# Patient Record
Sex: FEMALE | Race: BLACK OR AFRICAN AMERICAN | NOT HISPANIC OR LATINO | Employment: FULL TIME | RURAL
[De-identification: names, ages, dates, MRNs, and addresses within clinical notes are randomized per-mention and may not be internally consistent; named-entity substitution may affect disease eponyms.]

---

## 2020-08-06 ENCOUNTER — HISTORICAL (OUTPATIENT)
Dept: ADMINISTRATIVE | Facility: HOSPITAL | Age: 40
End: 2020-08-06

## 2020-08-07 LAB — SARS-COV+SARS-COV-2 AG RESP QL IA.RAPID: NEGATIVE

## 2020-08-14 ENCOUNTER — HISTORICAL (OUTPATIENT)
Dept: ADMINISTRATIVE | Facility: HOSPITAL | Age: 40
End: 2020-08-14

## 2020-08-15 LAB — SARS-COV+SARS-COV-2 AG RESP QL IA.RAPID: NEGATIVE

## 2020-08-19 ENCOUNTER — HISTORICAL (OUTPATIENT)
Dept: ADMINISTRATIVE | Facility: HOSPITAL | Age: 40
End: 2020-08-19

## 2020-08-20 LAB — SARS-COV+SARS-COV-2 AG RESP QL IA.RAPID: NEGATIVE

## 2020-08-25 ENCOUNTER — HISTORICAL (OUTPATIENT)
Dept: ADMINISTRATIVE | Facility: HOSPITAL | Age: 40
End: 2020-08-25

## 2020-08-26 LAB — SARS-COV+SARS-COV-2 AG RESP QL IA.RAPID: NEGATIVE

## 2020-09-02 ENCOUNTER — HISTORICAL (OUTPATIENT)
Dept: ADMINISTRATIVE | Facility: HOSPITAL | Age: 40
End: 2020-09-02

## 2020-09-03 LAB — SARS-COV+SARS-COV-2 AG RESP QL IA.RAPID: NEGATIVE

## 2020-09-08 ENCOUNTER — HISTORICAL (OUTPATIENT)
Dept: ADMINISTRATIVE | Facility: HOSPITAL | Age: 40
End: 2020-09-08

## 2020-09-09 LAB — SARS-COV+SARS-COV-2 AG RESP QL IA.RAPID: NEGATIVE

## 2020-09-16 ENCOUNTER — HISTORICAL (OUTPATIENT)
Dept: ADMINISTRATIVE | Facility: HOSPITAL | Age: 40
End: 2020-09-16

## 2020-09-17 LAB — SARS-COV+SARS-COV-2 AG RESP QL IA.RAPID: NEGATIVE

## 2020-09-22 ENCOUNTER — HISTORICAL (OUTPATIENT)
Dept: ADMINISTRATIVE | Facility: HOSPITAL | Age: 40
End: 2020-09-22

## 2020-09-23 LAB — SARS-COV+SARS-COV-2 AG RESP QL IA.RAPID: NEGATIVE

## 2020-09-30 ENCOUNTER — HISTORICAL (OUTPATIENT)
Dept: ADMINISTRATIVE | Facility: HOSPITAL | Age: 40
End: 2020-09-30

## 2020-09-30 LAB — SARS-COV+SARS-COV-2 AG RESP QL IA.RAPID: NEGATIVE

## 2020-10-01 ENCOUNTER — HISTORICAL (OUTPATIENT)
Dept: ADMINISTRATIVE | Facility: HOSPITAL | Age: 40
End: 2020-10-01

## 2020-10-05 LAB
CHOLEST SERPL-MCNC: 158 MG/DL
CHOLEST/HDLC SERPL: 2.5 {RATIO}
HDLC SERPL-MCNC: 63 MG/DL
LDLC SERPL CALC-MCNC: 86 MG/DL
TRIGL SERPL-MCNC: 44 MG/DL

## 2020-10-07 LAB
LAB AP CLINICAL INFORMATION: NORMAL
LAB AP GENERAL CAT - HISTORICAL: NORMAL
LAB AP INTERPRETATION/RESULT - HISTORICAL: NEGATIVE
LAB AP SPECIMEN ADEQUACY - HISTORICAL: NORMAL
LAB AP SPECIMEN SUBMITTED - HISTORICAL: NORMAL

## 2020-10-15 LAB
HPV 16/18: NORMAL
HPV OTHER: POSITIVE
THIN PREP: NORMAL

## 2020-10-20 ENCOUNTER — HISTORICAL (OUTPATIENT)
Dept: ADMINISTRATIVE | Facility: HOSPITAL | Age: 40
End: 2020-10-20

## 2020-10-20 LAB
BASOPHILS # BLD AUTO: 0.05 X10E3/UL (ref 0–0.2)
BASOPHILS NFR BLD AUTO: 1.2 % (ref 0–1)
EOSINOPHIL # BLD AUTO: 0.05 X10E3/UL (ref 0–0.5)
EOSINOPHIL NFR BLD AUTO: 1.2 % (ref 1–4)
ERYTHROCYTE [DISTWIDTH] IN BLOOD BY AUTOMATED COUNT: 13.2 % (ref 11.5–14.5)
HCT VFR BLD AUTO: 39.7 % (ref 38–47)
HGB BLD-MCNC: 12.9 G/DL (ref 12–16)
IMM GRANULOCYTES # BLD AUTO: 0.01 X10E3/UL (ref 0–0.04)
IMM GRANULOCYTES NFR BLD: 0.2 % (ref 0–0.4)
LYMPHOCYTES # BLD AUTO: 1.8 X10E3/UL (ref 1–4.8)
LYMPHOCYTES NFR BLD AUTO: 44.4 % (ref 27–41)
MCH RBC QN AUTO: 31.3 PG (ref 27–31)
MCHC RBC AUTO-ENTMCNC: 32.5 G/DL (ref 32–36)
MCV RBC AUTO: 96.4 FL (ref 80–96)
MONOCYTES # BLD AUTO: 0.6 X10E3/UL (ref 0–0.8)
MONOCYTES NFR BLD AUTO: 14.8 % (ref 2–6)
MPC BLD CALC-MCNC: 9.9 FL (ref 9.4–12.4)
NEUTROPHILS # BLD AUTO: 1.54 X10E3/UL (ref 1.8–7.7)
NEUTROPHILS NFR BLD AUTO: 38.2 % (ref 53–65)
NRBC # BLD AUTO: 0 X10E3/UL (ref 0–0)
NRBC, AUTO (.00): 0 /100 (ref 0–0)
PLATELET # BLD AUTO: 278 X10E3/UL (ref 150–400)
RBC # BLD AUTO: 4.12 X10E6/UL (ref 4.2–5.4)
WBC # BLD AUTO: 4.05 X10E3/UL (ref 4.5–11)

## 2020-10-23 ENCOUNTER — HISTORICAL (OUTPATIENT)
Dept: ADMINISTRATIVE | Facility: HOSPITAL | Age: 40
End: 2020-10-23

## 2020-10-23 LAB — SARS-COV+SARS-COV-2 AG RESP QL IA.RAPID: NEGATIVE

## 2020-10-28 ENCOUNTER — HISTORICAL (OUTPATIENT)
Dept: ADMINISTRATIVE | Facility: HOSPITAL | Age: 40
End: 2020-10-28

## 2020-10-29 LAB — SARS-COV+SARS-COV-2 AG RESP QL IA.RAPID: NEGATIVE

## 2020-11-03 ENCOUNTER — HISTORICAL (OUTPATIENT)
Dept: ADMINISTRATIVE | Facility: HOSPITAL | Age: 40
End: 2020-11-03

## 2020-11-04 LAB — SARS-COV+SARS-COV-2 AG RESP QL IA.RAPID: NEGATIVE

## 2020-11-11 ENCOUNTER — HISTORICAL (OUTPATIENT)
Dept: ADMINISTRATIVE | Facility: HOSPITAL | Age: 40
End: 2020-11-11

## 2020-11-11 LAB — SARS-COV+SARS-COV-2 AG RESP QL IA.RAPID: NEGATIVE

## 2020-11-17 ENCOUNTER — HISTORICAL (OUTPATIENT)
Dept: ADMINISTRATIVE | Facility: HOSPITAL | Age: 40
End: 2020-11-17

## 2020-11-18 LAB — SARS-COV+SARS-COV-2 AG RESP QL IA.RAPID: NEGATIVE

## 2020-11-20 ENCOUNTER — HISTORICAL (OUTPATIENT)
Dept: ADMINISTRATIVE | Facility: HOSPITAL | Age: 40
End: 2020-11-20

## 2020-11-20 LAB — IMPRESS AND RECOMMEND: NORMAL

## 2020-11-24 LAB — HCG UR QL IA.RAPID: NEGATIVE

## 2020-11-25 ENCOUNTER — HISTORICAL (OUTPATIENT)
Dept: ADMINISTRATIVE | Facility: HOSPITAL | Age: 40
End: 2020-11-25

## 2020-11-25 LAB — SARS-COV+SARS-COV-2 AG RESP QL IA.RAPID: NEGATIVE

## 2020-12-01 ENCOUNTER — HISTORICAL (OUTPATIENT)
Dept: ADMINISTRATIVE | Facility: HOSPITAL | Age: 40
End: 2020-12-01

## 2020-12-02 LAB — SARS-COV+SARS-COV-2 AG RESP QL IA.RAPID: NEGATIVE

## 2020-12-04 ENCOUNTER — HISTORICAL (OUTPATIENT)
Dept: ADMINISTRATIVE | Facility: HOSPITAL | Age: 40
End: 2020-12-04

## 2020-12-05 LAB — SARS-COV+SARS-COV-2 AG RESP QL IA.RAPID: NEGATIVE

## 2020-12-06 ENCOUNTER — HISTORICAL (OUTPATIENT)
Dept: ADMINISTRATIVE | Facility: HOSPITAL | Age: 40
End: 2020-12-06

## 2020-12-07 LAB — SARS-COV+SARS-COV-2 AG RESP QL IA.RAPID: NEGATIVE

## 2020-12-10 ENCOUNTER — HISTORICAL (OUTPATIENT)
Dept: ADMINISTRATIVE | Facility: HOSPITAL | Age: 40
End: 2020-12-10

## 2020-12-11 LAB — SARS-COV+SARS-COV-2 AG RESP QL IA.RAPID: NEGATIVE

## 2020-12-14 ENCOUNTER — HISTORICAL (OUTPATIENT)
Dept: ADMINISTRATIVE | Facility: HOSPITAL | Age: 40
End: 2020-12-14

## 2020-12-15 LAB — SARS-COV+SARS-COV-2 AG RESP QL IA.RAPID: NEGATIVE

## 2020-12-18 ENCOUNTER — HISTORICAL (OUTPATIENT)
Dept: ADMINISTRATIVE | Facility: HOSPITAL | Age: 40
End: 2020-12-18

## 2020-12-19 LAB — SARS-COV+SARS-COV-2 AG RESP QL IA.RAPID: NEGATIVE

## 2020-12-23 ENCOUNTER — HISTORICAL (OUTPATIENT)
Dept: ADMINISTRATIVE | Facility: HOSPITAL | Age: 40
End: 2020-12-23

## 2020-12-23 LAB — SARS-COV+SARS-COV-2 AG RESP QL IA.RAPID: NEGATIVE

## 2020-12-29 ENCOUNTER — HISTORICAL (OUTPATIENT)
Dept: ADMINISTRATIVE | Facility: HOSPITAL | Age: 40
End: 2020-12-29

## 2020-12-30 LAB — SARS-COV+SARS-COV-2 AG RESP QL IA.RAPID: NEGATIVE

## 2021-01-02 ENCOUNTER — HISTORICAL (OUTPATIENT)
Dept: ADMINISTRATIVE | Facility: HOSPITAL | Age: 41
End: 2021-01-02

## 2021-01-02 LAB — SARS-COV+SARS-COV-2 AG RESP QL IA.RAPID: NEGATIVE

## 2021-01-06 ENCOUNTER — HISTORICAL (OUTPATIENT)
Dept: ADMINISTRATIVE | Facility: HOSPITAL | Age: 41
End: 2021-01-06

## 2021-01-06 LAB — SARS-COV+SARS-COV-2 AG RESP QL IA.RAPID: NEGATIVE

## 2021-01-15 ENCOUNTER — HISTORICAL (OUTPATIENT)
Dept: ADMINISTRATIVE | Facility: HOSPITAL | Age: 41
End: 2021-01-15

## 2021-01-15 LAB — SARS-COV+SARS-COV-2 AG RESP QL IA.RAPID: NEGATIVE

## 2021-01-21 ENCOUNTER — HISTORICAL (OUTPATIENT)
Dept: ADMINISTRATIVE | Facility: HOSPITAL | Age: 41
End: 2021-01-21

## 2021-01-22 LAB — SARS-COV+SARS-COV-2 AG RESP QL IA.RAPID: NEGATIVE

## 2021-01-26 ENCOUNTER — HISTORICAL (OUTPATIENT)
Dept: ADMINISTRATIVE | Facility: HOSPITAL | Age: 41
End: 2021-01-26

## 2021-01-26 LAB — SARS-COV+SARS-COV-2 AG RESP QL IA.RAPID: NEGATIVE

## 2021-01-31 ENCOUNTER — HISTORICAL (OUTPATIENT)
Dept: ADMINISTRATIVE | Facility: HOSPITAL | Age: 41
End: 2021-01-31

## 2021-01-31 LAB — SARS-COV+SARS-COV-2 AG RESP QL IA.RAPID: NEGATIVE

## 2021-02-12 ENCOUNTER — HISTORICAL (OUTPATIENT)
Dept: ADMINISTRATIVE | Facility: HOSPITAL | Age: 41
End: 2021-02-12

## 2021-02-13 LAB — SARS-COV+SARS-COV-2 AG RESP QL IA.RAPID: NEGATIVE

## 2021-02-22 ENCOUNTER — HISTORICAL (OUTPATIENT)
Dept: ADMINISTRATIVE | Facility: HOSPITAL | Age: 41
End: 2021-02-22

## 2021-02-23 LAB — SARS-COV+SARS-COV-2 AG RESP QL IA.RAPID: NEGATIVE

## 2021-03-04 ENCOUNTER — HISTORICAL (OUTPATIENT)
Dept: ADMINISTRATIVE | Facility: HOSPITAL | Age: 41
End: 2021-03-04

## 2021-03-05 LAB — SARS-COV+SARS-COV-2 AG RESP QL IA.RAPID: NEGATIVE

## 2021-10-06 ENCOUNTER — OFFICE VISIT (OUTPATIENT)
Dept: OBSTETRICS AND GYNECOLOGY | Facility: CLINIC | Age: 41
End: 2021-10-06
Payer: COMMERCIAL

## 2021-10-06 VITALS
HEIGHT: 66 IN | BODY MASS INDEX: 39.82 KG/M2 | WEIGHT: 247.81 LBS | DIASTOLIC BLOOD PRESSURE: 74 MMHG | SYSTOLIC BLOOD PRESSURE: 120 MMHG

## 2021-10-06 DIAGNOSIS — R32 URINARY INCONTINENCE, UNSPECIFIED TYPE: ICD-10-CM

## 2021-10-06 DIAGNOSIS — Z13.29 THYROID DISORDER SCREENING: ICD-10-CM

## 2021-10-06 DIAGNOSIS — Z13.1 SCREENING FOR DIABETES MELLITUS: Primary | ICD-10-CM

## 2021-10-06 DIAGNOSIS — Z13.220 SCREENING FOR LIPOID DISORDERS: ICD-10-CM

## 2021-10-06 DIAGNOSIS — Z01.818 PRE-OP EXAMINATION: Primary | ICD-10-CM

## 2021-10-06 DIAGNOSIS — R87.810 CERVICAL HIGH RISK HUMAN PAPILLOMAVIRUS (HPV) DNA TEST POSITIVE: ICD-10-CM

## 2021-10-06 DIAGNOSIS — Z12.4 SCREENING FOR MALIGNANT NEOPLASM OF THE CERVIX: Primary | ICD-10-CM

## 2021-10-06 DIAGNOSIS — Z64.1 MULTIPARITY: Primary | ICD-10-CM

## 2021-10-06 DIAGNOSIS — Z30.2 ENCOUNTER FOR STERILIZATION: ICD-10-CM

## 2021-10-06 LAB
BACTERIA #/AREA URNS HPF: ABNORMAL /HPF
BILIRUB UR QL STRIP: NEGATIVE
CLARITY UR: CLEAR
COLOR UR: YELLOW
GLUCOSE UR STRIP-MCNC: NEGATIVE MG/DL
KETONES UR STRIP-SCNC: NEGATIVE MG/DL
LEUKOCYTE ESTERASE UR QL STRIP: NEGATIVE
MUCOUS THREADS #/AREA URNS HPF: ABNORMAL /HPF
NITRITE UR QL STRIP: NEGATIVE
PH UR STRIP: 6 PH UNITS
PROT UR QL STRIP: NEGATIVE
RBC # UR STRIP: ABNORMAL /UL
RBC #/AREA URNS HPF: ABNORMAL /HPF
SP GR UR STRIP: 1.02
SQUAMOUS #/AREA URNS LPF: ABNORMAL /LPF
UROBILINOGEN UR STRIP-ACNC: 0.2 MG/DL
WBC #/AREA URNS HPF: ABNORMAL /HPF

## 2021-10-06 PROCEDURE — 81001 URINALYSIS AUTO W/SCOPE: CPT | Mod: ,,, | Performed by: CLINICAL MEDICAL LABORATORY

## 2021-10-06 PROCEDURE — 1159F MED LIST DOCD IN RCRD: CPT | Mod: ,,, | Performed by: OBSTETRICS & GYNECOLOGY

## 2021-10-06 PROCEDURE — 3078F DIAST BP <80 MM HG: CPT | Mod: ,,, | Performed by: OBSTETRICS & GYNECOLOGY

## 2021-10-06 PROCEDURE — 3078F PR MOST RECENT DIASTOLIC BLOOD PRESSURE < 80 MM HG: ICD-10-PCS | Mod: ,,, | Performed by: OBSTETRICS & GYNECOLOGY

## 2021-10-06 PROCEDURE — 87624 HUMAN PAPILLOMAVIRUS (HPV): ICD-10-PCS | Mod: ,,, | Performed by: CLINICAL MEDICAL LABORATORY

## 2021-10-06 PROCEDURE — 87086 CULTURE, URINE: ICD-10-PCS | Mod: ,,, | Performed by: CLINICAL MEDICAL LABORATORY

## 2021-10-06 PROCEDURE — 99213 OFFICE O/P EST LOW 20 MIN: CPT | Mod: PBBFAC | Performed by: OBSTETRICS & GYNECOLOGY

## 2021-10-06 PROCEDURE — 99396 PREV VISIT EST AGE 40-64: CPT | Mod: S$PBB,,, | Performed by: OBSTETRICS & GYNECOLOGY

## 2021-10-06 PROCEDURE — 88142 CYTOPATH C/V THIN LAYER: CPT | Mod: GCY | Performed by: OBSTETRICS & GYNECOLOGY

## 2021-10-06 PROCEDURE — 87624 HPV HI-RISK TYP POOLED RSLT: CPT | Mod: ,,, | Performed by: CLINICAL MEDICAL LABORATORY

## 2021-10-06 PROCEDURE — 3074F PR MOST RECENT SYSTOLIC BLOOD PRESSURE < 130 MM HG: ICD-10-PCS | Mod: ,,, | Performed by: OBSTETRICS & GYNECOLOGY

## 2021-10-06 PROCEDURE — 81001 URINALYSIS: ICD-10-PCS | Mod: ,,, | Performed by: CLINICAL MEDICAL LABORATORY

## 2021-10-06 PROCEDURE — 3008F PR BODY MASS INDEX (BMI) DOCUMENTED: ICD-10-PCS | Mod: ,,, | Performed by: OBSTETRICS & GYNECOLOGY

## 2021-10-06 PROCEDURE — 1160F RVW MEDS BY RX/DR IN RCRD: CPT | Mod: ,,, | Performed by: OBSTETRICS & GYNECOLOGY

## 2021-10-06 PROCEDURE — 3008F BODY MASS INDEX DOCD: CPT | Mod: ,,, | Performed by: OBSTETRICS & GYNECOLOGY

## 2021-10-06 PROCEDURE — 99396 PR PREVENTIVE VISIT,EST,40-64: ICD-10-PCS | Mod: S$PBB,,, | Performed by: OBSTETRICS & GYNECOLOGY

## 2021-10-06 PROCEDURE — 1159F PR MEDICATION LIST DOCUMENTED IN MEDICAL RECORD: ICD-10-PCS | Mod: ,,, | Performed by: OBSTETRICS & GYNECOLOGY

## 2021-10-06 PROCEDURE — 1160F PR REVIEW ALL MEDS BY PRESCRIBER/CLIN PHARMACIST DOCUMENTED: ICD-10-PCS | Mod: ,,, | Performed by: OBSTETRICS & GYNECOLOGY

## 2021-10-06 PROCEDURE — 3074F SYST BP LT 130 MM HG: CPT | Mod: ,,, | Performed by: OBSTETRICS & GYNECOLOGY

## 2021-10-06 PROCEDURE — 87086 URINE CULTURE/COLONY COUNT: CPT | Mod: ,,, | Performed by: CLINICAL MEDICAL LABORATORY

## 2021-10-06 RX ORDER — ETONOGESTREL AND ETHINYL ESTRADIOL VAGINAL RING .015; .12 MG/D; MG/D
RING VAGINAL
COMMUNITY
Start: 2021-09-18

## 2021-10-07 LAB
GH SERPL-MCNC: NORMAL NG/ML
INSULIN SERPL-ACNC: NORMAL U[IU]/ML
LAB AP CLINICAL INFORMATION: NORMAL
LAB AP GYN INTERPRETATION: NEGATIVE
LAB AP PAP DISCLAIMER COMMENTS: NORMAL
RENIN PLAS-CCNC: NORMAL NG/ML/H

## 2021-10-08 LAB — UA COMPLETE W REFLEX CULTURE PNL UR: NO GROWTH

## 2021-10-13 LAB
HPV 16: NEGATIVE
HPV 18: NEGATIVE
HPV OTHER: POSITIVE

## 2021-10-14 DIAGNOSIS — R87.810 CERVICAL HIGH RISK HUMAN PAPILLOMAVIRUS (HPV) DNA TEST POSITIVE: Primary | ICD-10-CM

## 2021-11-09 DIAGNOSIS — Z30.2 ENCOUNTER FOR STERILIZATION: ICD-10-CM

## 2021-11-09 DIAGNOSIS — Z64.1 MULTIPARITY: Primary | ICD-10-CM

## 2022-05-09 ENCOUNTER — PROCEDURE VISIT (OUTPATIENT)
Dept: OBSTETRICS AND GYNECOLOGY | Facility: CLINIC | Age: 42
End: 2022-05-09
Payer: COMMERCIAL

## 2022-05-09 VITALS
SYSTOLIC BLOOD PRESSURE: 128 MMHG | WEIGHT: 237.19 LBS | DIASTOLIC BLOOD PRESSURE: 72 MMHG | BODY MASS INDEX: 38.29 KG/M2

## 2022-05-09 DIAGNOSIS — R87.810 CERVICAL HIGH RISK HUMAN PAPILLOMAVIRUS (HPV) DNA TEST POSITIVE: Primary | ICD-10-CM

## 2022-05-09 PROCEDURE — 88305 TISSUE EXAM BY PATHOLOGIST: CPT | Mod: 26,,, | Performed by: PATHOLOGY

## 2022-05-09 PROCEDURE — 57454 PR COLPOSC,CERVIX W/ADJ VAG,W/BX & CURRETAG: ICD-10-PCS | Mod: S$PBB,,, | Performed by: OBSTETRICS & GYNECOLOGY

## 2022-05-09 PROCEDURE — 88305 SURGICAL PATHOLOGY: ICD-10-PCS | Mod: 26,,, | Performed by: PATHOLOGY

## 2022-05-09 PROCEDURE — 88305 TISSUE EXAM BY PATHOLOGIST: CPT | Mod: SUR | Performed by: OBSTETRICS & GYNECOLOGY

## 2022-05-09 PROCEDURE — 88342 SURGICAL PATHOLOGY: ICD-10-PCS | Mod: 26,,, | Performed by: PATHOLOGY

## 2022-05-09 PROCEDURE — 88342 IMHCHEM/IMCYTCHM 1ST ANTB: CPT | Mod: 26,,, | Performed by: PATHOLOGY

## 2022-05-09 PROCEDURE — 57454 BX/CURETT OF CERVIX W/SCOPE: CPT | Mod: S$PBB,,, | Performed by: OBSTETRICS & GYNECOLOGY

## 2022-05-09 PROCEDURE — 57454 BX/CURETT OF CERVIX W/SCOPE: CPT | Mod: PBBFAC | Performed by: OBSTETRICS & GYNECOLOGY

## 2022-05-09 RX ORDER — MELOXICAM 15 MG/1
TABLET ORAL
Status: ON HOLD | COMMUNITY
Start: 2022-05-04 | End: 2022-08-22

## 2022-05-09 RX ORDER — METHOCARBAMOL 750 MG/1
750 TABLET, FILM COATED ORAL NIGHTLY PRN
COMMUNITY
Start: 2022-05-04

## 2022-05-09 NOTE — PATIENT INSTRUCTIONS
Follow-up appointment with me in approximately 4 weeks.    She will notify me if any problems arise

## 2022-05-09 NOTE — PROCEDURES
Presents for colposcopic examination.    Patient has had 2 previous Paps yearly which were negative however HPV other were persistently positive    HPV 16 and 18 were both negative.    She was instructed present for colposcopic examination there has been some delay secondary to workup.    Speculum exam done today cervix is grossly normal to appearance.  Cervix was painted with ascetic acid.    Thorough colposcopic exam was performed with both white and green light.  There was some mild to at most moderate aceto-white areas noted at 11:31 a.m..  However the squamocolumnar junction was seen 360°.  There was no aceto-white area within the endocervical canal.  Representative biopsies were taken at 11 and 12:00 p.m. another biopsy was taken at 6:00 a.m..  ECC performed.    Monsel's solution applied.  Good hemostasis noted.    Findings were discussed in the office.  We will follow-up pathology report.  She desires contraception has previously used NuvaRing    The options of a NuvaRing versus Mirena IUD discussed she does have moderate dysmenorrhea and menorrhagia.    Depending on pathology report we have also discussed the possibility of proceeding with NovaSure endometrial ablation and tubal ligation.    The option of hysterectomy discussed however we will discussed final management upon review of pathology.    Follow-up appointment in approximately 3-4 weeks.

## 2022-05-11 DIAGNOSIS — M25.561 RIGHT KNEE PAIN: Primary | ICD-10-CM

## 2022-05-11 LAB
DHEA SERPL-MCNC: NORMAL
ESTROGEN SERPL-MCNC: NORMAL PG/ML
INSULIN SERPL-ACNC: NORMAL U[IU]/ML
LAB AP CLINICAL INFORMATION: NORMAL
LAB AP GROSS DESCRIPTION: NORMAL
LAB AP LABORATORY NOTES: NORMAL
T3RU NFR SERPL: NORMAL %

## 2022-05-12 ENCOUNTER — TELEPHONE (OUTPATIENT)
Dept: OBSTETRICS AND GYNECOLOGY | Facility: CLINIC | Age: 42
End: 2022-05-12
Payer: COMMERCIAL

## 2022-05-12 NOTE — TELEPHONE ENCOUNTER
Discussed cervical biopsies and endocervical curettage revealed low-grade dysplasia.    We had previously discussed proceeding with hysteroscopic exam with D&C NovaSure ablation and tubal ligation.    However she also has moderate dysmenorrhea and now documented mild cervical dysplasia.    Discussed the possibility of proceeding with hysterectomy to address all of these problems.  We will discuss this further when she has a follow-up appointment with me June 6th 2022.    She voices understanding and will have follow-up as directed.

## 2022-05-17 ENCOUNTER — TELEPHONE (OUTPATIENT)
Dept: OBSTETRICS AND GYNECOLOGY | Facility: CLINIC | Age: 42
End: 2022-05-17
Payer: COMMERCIAL

## 2022-05-17 NOTE — TELEPHONE ENCOUNTER
Called patient, message left with instructions, (had spoken to patient on yesterday with issue she was having). To call office back if symptoms persist

## 2022-05-17 NOTE — TELEPHONE ENCOUNTER
----- Message from Savana Butt sent at 5/16/2022 10:40 AM CDT -----  Regarding: Phone call  Wants to talk with you.   Phone#: [phone].

## 2022-05-18 ENCOUNTER — CLINICAL SUPPORT (OUTPATIENT)
Dept: REHABILITATION | Facility: HOSPITAL | Age: 42
End: 2022-05-18
Payer: COMMERCIAL

## 2022-05-18 DIAGNOSIS — M25.561 RIGHT KNEE PAIN: ICD-10-CM

## 2022-05-18 DIAGNOSIS — M25.561 CHRONIC PAIN OF RIGHT KNEE: ICD-10-CM

## 2022-05-18 DIAGNOSIS — G89.29 CHRONIC PAIN OF RIGHT KNEE: ICD-10-CM

## 2022-05-18 PROCEDURE — 97161 PT EVAL LOW COMPLEX 20 MIN: CPT

## 2022-05-18 NOTE — PLAN OF CARE
[facility]  Physical Therapy Initial Evaluation    Name: Yolie Russell  Clinic Number: [id number]    Therapy Diagnosis:   Encounter Diagnosis   Name Primary?    Right knee pain      Physician: Dani Hung DO    Physician Orders: PT Eval and Treat   Medical Diagnosis from Referral: Right knee pain  Evaluation Date: 5/18/2022  Authorization Period Expiration:   Plan of Care Expiration: 6/24/22  Visit # / Visits authorized: 1/ 10    Time In: 1357  Time Out: 1420  Total Appointment Time (timed & untimed codes): 20 minutes    Precautions: Standard    Subjective   Date of onset: 3 1/2 years ago.  History of current condition - Yolie reports: a history of right knee pain for 3 1/2 years but has worsened in the last year with increased pain and swelling. Reports knee pops while walking and it locks up sometimes.      Medical History:   No past medical history on file.    Surgical History:   Yolie Russell  has a past surgical history that includes Dilation and curettage of uterus.    Medications:   Yolie has a current medication list which includes the following prescription(s): etonogestrel-ethinyl estradiol, meloxicam, and methocarbamol.    Allergies:   Review of patient's allergies indicates:  No Known Allergies     Imaging, MRI studies: but not available    Prior Therapy: yes  Social History:  lives with their family  Occupation: Nurse  Prior Level of Function: Independent  Current Level of Function: independent    Pain:  Current 3/10, worst 10/10, best 0/10   Location: right knee   Description: Aching, Dull and locks up  Aggravating Factors: Sitting, Standing, Bending and Walking  Easing Factors: pain medication and knee brace, weight loss    Pts goals: I want the pain and popping to stop.    Objective       Girth Measurements: Right 4 Left 45  Comments:      Range of motion:  Motion Right Left    Hip flexion  WITHIN FUNCTIONAL LIMITS  WITHIN FUNCTIONAL LIMITS   Hip extension  WITHIN FUNCTIONAL LIMITS   WITHIN FUNCTIONAL LIMITS   Hip abduction  WITHIN FUNCTIONAL LIMITS  WITHIN FUNCTIONAL LIMITS   Hip adduction  WITHIN FUNCTIONAL LIMITS  WITHIN FUNCTIONAL LIMITS   Internal rotation  WITHIN FUNCTIONAL LIMITS  WITHIN FUNCTIONAL LIMITS   External rotation  WITHIN FUNCTIONAL LIMITS  WITHIN FUNCTIONAL LIMITS   Knee extension  WITHIN FUNCTIONAL LIMITS  WITHIN FUNCTIONAL LIMITS   Knee flexion  125  WITHIN FUNCTIONAL LIMITS   Ankle DF  WITHIN FUNCTIONAL LIMITS  WITHIN FUNCTIONAL LIMITS   Ankle PF  WITHIN FUNCTIONAL LIMITS  WITHIN FUNCTIONAL LIMITS   Ankle Inversion  WITHIN FUNCTIONAL LIMITS  WITHIN FUNCTIONAL LIMITS   Ankle Eversion  WITHIN FUNCTIONAL LIMITS  WITHIN FUNCTIONAL LIMITS       Manual muscle test   Muscle Right  Left    Hip flexion  MMT strength: 5/5  MMT strength: 5/5   Hip extension  MMT strength: 5/5  MMT strength: 5/5   Hip abduction  MMT strength: 5/5  MMT strength: 5/5   Hip adduction  MMT strength: 5/5  MMT strength: 5/5   Hip internal rotation  MMT strength: 5/5  MMT strength: 5/5   Hip external rotation  MMT strength: 5/5  MMT strength: 5/5   Knee extension  MMT strength: 4/5  MMT strength: 5/5   Knee flexion  MMT strength: 4/5  MMT strength: 5/5   Ankle DF  MMT strength: 5/5  MMT strength: 5/5   Ankle PF  MMT strength: 5/5  MMT strength: 5/5   Ankle inversion  MMT strength: 5/5  MMT strength: 5/5   Ankle eversion  MMT strength: 5/5  MMT strength: 5/5       Gait:  Weight bearing precautions: FWB  Assistive device: none  Ambulation distance and deviations: community ambulator  Stairs: popping right knee ascending steps  Comments:      Clinical Special Tests:    Knee  1. Lochman's test: right Negative   2. Anterior drawer: right Negative   3. Posterior drawer: right Negative   4. Varus stress test: right Negative   5. Valgus stress test: right Positive   6. PFJ grind test: right Positive for patella femoral syndrome  7. McMurrays: right Negative      Assessment   Yolie is a 41 y.o. female referred to  outpatient Physical Therapy with a medical diagnosis of Right knee pain. Pt presents with pain, muscle weakness and knee joint instability.    Pt prognosis is Good.   Pt will benefit from skilled outpatient Physical Therapy to address the deficits stated above and in the chart below, provide pt/family education, and to maximize pt's level of independence.     Plan of care discussed with patient: Yes  Pt's spiritual, cultural and educational needs considered and patient is agreeable to the plan of care and goals as stated below:     Anticipated Barriers for therapy: none      Goals:  Short Term Goals: 2 weeks   1. Pt will be independent in performing right LE Home Exercise Program for strengthening.  2. Pt will increase right knee strength to 5/5 to allow patient to increase functional activity and mobility.  3. Pt will report decrease in pain to 0/10 at rest and 4/10 or less with activity to improve quality of life.    Long Term Goals: 4 weeks   1. Pt will tolerate 30 to 40 minutes exercise/activity with right knee pain no greater than 2/10.  2. Pt will demonstrate independent taping of right knee for at home management of patella femoral symptoms.        Plan   Plan of care Certification: 5/18/2022 to 6/24/22.    Outpatient Physical Therapy 2 times weekly for 5 weeks to include the following interventions: Moist Heat/ Ice, Patient Education, Therapeutic Exercise and Kinesio Tape.     Supervised visit: Case conference with Lavonne Apple, ERNESTINE and POC reviewed.    Geovani Alves, PT      Physician Signature:________________________________________________      Date:____________________________________________________________

## 2022-05-23 ENCOUNTER — CLINICAL SUPPORT (OUTPATIENT)
Dept: REHABILITATION | Facility: HOSPITAL | Age: 42
End: 2022-05-23
Payer: COMMERCIAL

## 2022-05-23 DIAGNOSIS — G89.29 CHRONIC PAIN OF RIGHT KNEE: Primary | ICD-10-CM

## 2022-05-23 DIAGNOSIS — M25.561 CHRONIC PAIN OF RIGHT KNEE: Primary | ICD-10-CM

## 2022-05-23 PROCEDURE — 97110 THERAPEUTIC EXERCISES: CPT

## 2022-05-23 NOTE — PROGRESS NOTES
Physical Therapy Daily Note     Name: Yolie Russell  [facility] Number: [id number]  Diagnosis:   Encounter Diagnosis   Name Primary?    Chronic pain of right knee Yes     Physician: Dani Hung DO  Precautions: standard  Visit #: 2 of 10  PTA Visit #:   Time In: 1400  Time Out: 1430    Subjective     Pt reports: right knee aches and pops.  Pain Scale: Yolie rates pain on a scale of 0-10 to be 3 currently.    Objective     Yolie received individual therapeutic exercises to develop strength and stability for 30 minutes including:  SciFit stepper 6 minutes 2 level  Quad sets 2 x 15  4 way right leg raises: SLR, hip ADD, hip ABD, hip extension 2 x 15 with 2 lb  Bridging with knee ball 2 x 15  Hamstring curls 2 x 15 green TB  BOSU ball round side up single leg stand with knee bent 3 x 30 seconds    Written Home Exercises Provided: yes with printed handout.  Pt demo good understanding of the education provided. Birdie demonstrated good return demonstration of activities.     Education provided re:  Yolie received verbal and tactile cues to facilitate proper execution of exercises and body mechanics.  No spiritual or educational barriers to learning provided    Assessment     Patient tolerated well.    This is a 41 y.o. female referred to outpatient physical therapy and presents with a medical diagnosis of right knee pain and demonstrates limitations as described in the problem list. Pt prognosis is Good. Pt will continue to benefit from skilled outpatient physical therapy to address the deficits listed in the problem list, provide pt/family education and to maximize pt's level of independence in the home and community environment.     Goals as follows:  Goals:  Short Term Goals: 2 weeks   1. Pt will be independent in performing right LE Home Exercise Program for strengthening.  2. Pt will increase right knee strength to 5/5 to allow patient to increase functional activity  and mobility.  3. Pt will report decrease in pain to 0/10 at rest and 4/10 or less with activity to improve quality of life.    Long Term Goals: 4 weeks   1. Pt will tolerate 30 to 40 minutes exercise/activity with right knee pain no greater than 2/10.  2. Pt will demonstrate independent taping of right knee for at home management of patella femoral symptoms.       Plan     Continue with established Plan of Care towards PT goals.    Therapist: Geovani Alves, PT  5/23/2022

## 2022-05-24 ENCOUNTER — CLINICAL SUPPORT (OUTPATIENT)
Dept: REHABILITATION | Facility: HOSPITAL | Age: 42
End: 2022-05-24
Payer: COMMERCIAL

## 2022-05-24 DIAGNOSIS — G89.29 CHRONIC PAIN OF RIGHT KNEE: Primary | ICD-10-CM

## 2022-05-24 DIAGNOSIS — M25.561 CHRONIC PAIN OF RIGHT KNEE: Primary | ICD-10-CM

## 2022-05-24 PROCEDURE — 97110 THERAPEUTIC EXERCISES: CPT

## 2022-05-24 NOTE — PROGRESS NOTES
Physical Therapy Daily Note     Name: Yolie Russell  [facility] Number: [id number]  Diagnosis:   Encounter Diagnosis   Name Primary?    Chronic pain of right knee Yes     Physician: Dani Hung DO  Precautions: standard  Visit #: 3 of 10  PTA Visit #:   Time In: 1400  Time Out: 1440    Subjective     Pt reports: right knee continues to ache and pops.  Pain Scale: Yolie rates pain on a scale of 0-10 to be 3 currently.    Objective     Yolie received individual therapeutic exercises to develop strength and stability for 30 minutes including:  SciFit stepper 6 minutes 3 level  Quad sets 2 x 15  4 way right leg raises: SLR, hip ADD, hip ABD, hip extension 2 x 15 with 2 lb  Bridging with knee ball 2 x 15  Hamstring curls 2 x 15 green TB  BOSU ball round side up single leg stand with knee bent 3 x 45 seconds    Kinesio Tape applied to right knee for patellar tracking and stability    Written Home Exercises Provided: completed    Education provided re:  Yolie received verbal and tactile cues to facilitate proper execution of exercises and body mechanics.  No spiritual or educational barriers to learning provided    Assessment     Patient tolerated well. Pt reports some patellar popping with hip adduction/abduction.    This is a 41 y.o. female referred to outpatient physical therapy and presents with a medical diagnosis of right knee pain and demonstrates limitations as described in the problem list. Pt prognosis is Good. Pt will continue to benefit from skilled outpatient physical therapy to address the deficits listed in the problem list, provide pt/family education and to maximize pt's level of independence in the home and community environment.     Goals as follows:  Goals:  Short Term Goals: 2 weeks   1. Pt will be independent in performing right LE Home Exercise Program for strengthening.  2. Pt will increase right knee strength to 5/5 to allow patient to increase  functional activity and mobility.  3. Pt will report decrease in pain to 0/10 at rest and 4/10 or less with activity to improve quality of life.    Long Term Goals: 4 weeks   1. Pt will tolerate 30 to 40 minutes exercise/activity with right knee pain no greater than 2/10.  2. Pt will demonstrate independent taping of right knee for at home management of patella femoral symptoms.       Plan     Continue with established Plan of Care towards PT goals.    Therapist: Geovani Alves, PT  5/24/2022

## 2022-06-02 ENCOUNTER — CLINICAL SUPPORT (OUTPATIENT)
Dept: REHABILITATION | Facility: HOSPITAL | Age: 42
End: 2022-06-02
Payer: COMMERCIAL

## 2022-06-02 DIAGNOSIS — M25.561 CHRONIC PAIN OF RIGHT KNEE: Primary | ICD-10-CM

## 2022-06-02 DIAGNOSIS — G89.29 CHRONIC PAIN OF RIGHT KNEE: Primary | ICD-10-CM

## 2022-06-02 PROCEDURE — 97110 THERAPEUTIC EXERCISES: CPT

## 2022-06-02 NOTE — PROGRESS NOTES
"                                                    Physical Therapy Daily Note     Name: Yolie Russell  [facility] Number: [id number]  Diagnosis:   Encounter Diagnosis   Name Primary?    Chronic pain of right knee Yes     Physician: Dani Hung DO  Precautions: standard  Visit #: 4 of 10  PTA Visit #:   Time In: 753  Time Out: 836    Subjective     Pt reports: no pain in right knee but it still "pops"  Pain Scale: Yolie rates pain on a scale of 0-10 to be 0 currently.    Objective     Yolie received individual therapeutic exercises to develop strength and stability for 43 minutes including:  SciFit stepper 6 minutes 3.5 level  Quad sets 3 x 15  4 way right leg raises: SLR, hip ADD, hip ABD, hip extension 3 x 15 with 2.5 lb  Bridging with knee ball 3 x 15  Hamstring curls 3 x 15 green TB  10 inch roll single leg stand with knee bent 3 x 60 seconds    Kinesio Tape applied to right knee for patellar tracking and stability    Written Home Exercises Provided: completed    Education provided re:  Yolie received verbal and tactile cues to facilitate proper execution of exercises and body mechanics.  No spiritual or educational barriers to learning provided    Assessment     Patient tolerated well. Pt reports she feels that the kinesio tape is helping her right knee. Pt able to tolerate increased weight resistance during exercises.    This is a 41 y.o. female referred to outpatient physical therapy and presents with a medical diagnosis of right knee pain and demonstrates limitations as described in the problem list. Pt prognosis is Good. Pt will continue to benefit from skilled outpatient physical therapy to address the deficits listed in the problem list, provide pt/family education and to maximize pt's level of independence in the home and community environment.     Goals as follows:  Goals:  Short Term Goals: 2 weeks   1. Pt will be independent in performing right LE Home Exercise Program for strengthening. Goal Met.  2. " Pt will increase right knee strength to 5/5 to allow patient to increase functional activity and mobility.  3. Pt will report decrease in pain to 0/10 at rest and 4/10 or less with activity to improve quality of life.    Long Term Goals: 4 weeks   1. Pt will tolerate 30 to 40 minutes exercise/activity with right knee pain no greater than 2/10.  2. Pt will demonstrate independent taping of right knee for at home management of patella femoral symptoms.       Plan     Continue with established Plan of Care towards PT goals.    Therapist: Geovani Alves, PT  6/2/2022

## 2022-06-03 ENCOUNTER — CLINICAL SUPPORT (OUTPATIENT)
Dept: REHABILITATION | Facility: HOSPITAL | Age: 42
End: 2022-06-03
Payer: COMMERCIAL

## 2022-06-03 DIAGNOSIS — M25.561 CHRONIC PAIN OF RIGHT KNEE: Primary | ICD-10-CM

## 2022-06-03 DIAGNOSIS — G89.29 CHRONIC PAIN OF RIGHT KNEE: Primary | ICD-10-CM

## 2022-06-03 PROCEDURE — 97110 THERAPEUTIC EXERCISES: CPT

## 2022-06-03 NOTE — PROGRESS NOTES
Physical Therapy Daily Note     Name: Yolie Russell  Clinic Number: [id number]  Diagnosis:   Encounter Diagnosis   Name Primary?    Chronic pain of right knee Yes     Physician: Dani Hung DO  Precautions: standard   Visit #: 5 of 10  PTA Visit #:   Time In: 1400  Time Out: 1433    Subjective     Pt reports: The knee tape seems to be helping.  Pain Scale: Yolie rates pain on a scale of 0-10 to be 0 currently.    Objective       Yolie received individual therapeutic exercises to develop strength and stability for 33 minutes including:  SciFit stepper 6 minutes 4 level  Quad sets 3 x 15  4 way right leg raises: SLR, hip ADD, hip ABD, hip extension 3 x 15 with 2.5 lb  Bridging with knee ball 3 x 15  Hamstring curls 3 x 15 green TB  10 inch roll single leg stand with knee bent 2 x 60 seconds  BOSU ball single leg stand round side up 2 x 60 seconds  Standing hip adduction with green TB 3 x 15    Written Home Exercises Provided: completed    Education provided re:  Yolie received verbal and tactile cues to facilitate proper execution of exercises and body mechanics.  No spiritual or educational barriers to learning provided    Assessment     Patient tolerated well.     This is a 41 y.o. female referred to outpatient physical therapy and presents with a medical diagnosis of right knee pain and demonstrates limitations as described in the problem list. Pt prognosis is Good. Pt will continue to benefit from skilled outpatient physical therapy to address the deficits listed in the problem list, provide pt/family education and to maximize pt's level of independence in the home and community environment.     Goals as follows:  Goals:  Short Term Goals: 2 weeks   1. Pt will be independent in performing right LE Home Exercise Program for strengthening. Goal Met.  2. Pt will increase right knee strength to 5/5 to allow patient to increase functional activity and mobility.  3.  Pt will report decrease in pain to 0/10 at rest and 4/10 or less with activity to improve quality of life.    Long Term Goals: 4 weeks   1. Pt will tolerate 30 to 40 minutes exercise/activity with right knee pain no greater than 2/10.  2. Pt will demonstrate independent taping of right knee for at home management of patella femoral symptoms.       Plan     Continue with established Plan of Care towards PT goals.    Therapist: Geovani Alves, PT  6/3/2022

## 2022-06-06 ENCOUNTER — OFFICE VISIT (OUTPATIENT)
Dept: OBSTETRICS AND GYNECOLOGY | Facility: CLINIC | Age: 42
End: 2022-06-06
Payer: COMMERCIAL

## 2022-06-06 VITALS — SYSTOLIC BLOOD PRESSURE: 132 MMHG | DIASTOLIC BLOOD PRESSURE: 80 MMHG

## 2022-06-06 DIAGNOSIS — N94.6 DYSMENORRHEA: ICD-10-CM

## 2022-06-06 DIAGNOSIS — N92.4 EXCESSIVE BLEEDING IN PREMENOPAUSAL PERIOD: ICD-10-CM

## 2022-06-06 DIAGNOSIS — N87.9 CERVICAL DYSPLASIA: ICD-10-CM

## 2022-06-06 DIAGNOSIS — L73.9 FOLLICULITIS: Primary | ICD-10-CM

## 2022-06-06 PROCEDURE — 3079F DIAST BP 80-89 MM HG: CPT | Mod: ,,, | Performed by: OBSTETRICS & GYNECOLOGY

## 2022-06-06 PROCEDURE — 96372 THER/PROPH/DIAG INJ SC/IM: CPT | Mod: PBBFAC | Performed by: OBSTETRICS & GYNECOLOGY

## 2022-06-06 PROCEDURE — 3079F PR MOST RECENT DIASTOLIC BLOOD PRESSURE 80-89 MM HG: ICD-10-PCS | Mod: ,,, | Performed by: OBSTETRICS & GYNECOLOGY

## 2022-06-06 PROCEDURE — 1159F MED LIST DOCD IN RCRD: CPT | Mod: ,,, | Performed by: OBSTETRICS & GYNECOLOGY

## 2022-06-06 PROCEDURE — 3075F SYST BP GE 130 - 139MM HG: CPT | Mod: ,,, | Performed by: OBSTETRICS & GYNECOLOGY

## 2022-06-06 PROCEDURE — 1159F PR MEDICATION LIST DOCUMENTED IN MEDICAL RECORD: ICD-10-PCS | Mod: ,,, | Performed by: OBSTETRICS & GYNECOLOGY

## 2022-06-06 PROCEDURE — 99214 OFFICE O/P EST MOD 30 MIN: CPT | Mod: PBBFAC | Performed by: OBSTETRICS & GYNECOLOGY

## 2022-06-06 PROCEDURE — 99213 OFFICE O/P EST LOW 20 MIN: CPT | Mod: S$PBB,25,, | Performed by: OBSTETRICS & GYNECOLOGY

## 2022-06-06 PROCEDURE — 99213 PR OFFICE/OUTPT VISIT, EST, LEVL III, 20-29 MIN: ICD-10-PCS | Mod: S$PBB,25,, | Performed by: OBSTETRICS & GYNECOLOGY

## 2022-06-06 PROCEDURE — 3075F PR MOST RECENT SYSTOLIC BLOOD PRESS GE 130-139MM HG: ICD-10-PCS | Mod: ,,, | Performed by: OBSTETRICS & GYNECOLOGY

## 2022-06-06 RX ORDER — LIDOCAINE HYDROCHLORIDE 10 MG/ML
2.1 INJECTION INFILTRATION; PERINEURAL ONCE
Status: COMPLETED | OUTPATIENT
Start: 2022-06-06 | End: 2022-06-06

## 2022-06-06 RX ORDER — CEFTRIAXONE 1 G/1
1 INJECTION, POWDER, FOR SOLUTION INTRAMUSCULAR; INTRAVENOUS
Status: COMPLETED | OUTPATIENT
Start: 2022-06-06 | End: 2022-06-06

## 2022-06-06 RX ORDER — SULFAMETHOXAZOLE AND TRIMETHOPRIM 800; 160 MG/1; MG/1
1 TABLET ORAL 2 TIMES DAILY
Qty: 14 TABLET | Refills: 0 | Status: SHIPPED | OUTPATIENT
Start: 2022-06-06 | End: 2022-06-13

## 2022-06-06 RX ADMIN — LIDOCAINE HYDROCHLORIDE 2.1 ML: 10 INJECTION, SOLUTION INFILTRATION; PERINEURAL at 03:06

## 2022-06-06 RX ADMIN — CEFTRIAXONE SODIUM 1 G: 1 INJECTION, POWDER, FOR SOLUTION INTRAMUSCULAR; INTRAVENOUS at 03:06

## 2022-06-06 NOTE — PROGRESS NOTES
Subjective:       Patient ID: Yolie Russell is a 41 y.o. female.    Chief Complaint: Follow-up (Here for f/u after colposcopy. Pt wants to have area checked on bottom, possible boil that has come up.)    Presents for problem visit and to discussed previous colposcopic exam.    Patient had persistent positive HPV although 16 and 18 were negative.  Paps have remained normal.    However recent colposcopic  exam with cervical biopsies and endocervical curettage revealed low grade dysplasia both of the ectocervix and focal low-grade dysplasia within the endocervical canal.    In addition she has recently had 2 boils come up on the left buttocks.        Review of Systems      Objective:      Physical Exam  Genitourinary:     Comments: On examination of left buttock area there is 1 completely healed area of folliculitis.  Another area of approximately 1 and half to 2 cm is slightly fluctuant but no significant tenderness no redness.  Not drainable at present.        Assessment:       1. Folliculitis    2. Dysmenorrhea    3. Excessive bleeding in premenopausal period    4. Cervical dysplasia        Plan:       Patient Instructions   Discussed giving Rocephin 1 g IM in the office today to be followed by Bactrim DS b.i.d. over the next week.  We discussed if any worsening symptoms noted to call for [place] of boil.    In addition today we discussed recent colposcopic findings of mild dysplasia of both the ectocervix and endocervical canal.    Previously she had requested tubal ligation.  In addition she does have moderate dysmenorrhea she does take over-the-counter medication.    She has stopped NuvaRing approximally 1 year ago.    Not sexually active at present.  She will use condoms if sexual activity occurs.    We discussed Mirena IUD versus proceeding with hysterectomy for definitive management of sterilization, menorrhagia dysmenorrhea and mild dysplasia.    The options of conization of the cervix and IUD of were  discussed.    She desires proceeding with hysterectomy    We have discussed the options disadvantages and advantages of elective oophorectomy patient to decide.

## 2022-06-06 NOTE — PATIENT INSTRUCTIONS
Discussed giving Rocephin 1 g IM in the office today to be followed by Bactrim DS b.i.d. over the next week.  We discussed if any worsening symptoms noted to call for [place] of boil.    In addition today we discussed recent colposcopic findings of mild dysplasia of both the ectocervix and endocervical canal.    Previously she had requested tubal ligation.  In addition she does have moderate dysmenorrhea she does take over-the-counter medication.    She has stopped NuvaRing approximally 1 year ago.    Not sexually active at present.  She will use condoms if sexual activity occurs.    We discussed Mirena IUD versus proceeding with hysterectomy for definitive management of sterilization, menorrhagia dysmenorrhea and mild dysplasia.    The options of conization of the cervix and IUD of were discussed.    She desires proceeding with hysterectomy    We have discussed the options disadvantages and advantages of elective oophorectomy patient to decide.    Preoperative examination given August the [id number].

## 2022-06-07 ENCOUNTER — CLINICAL SUPPORT (OUTPATIENT)
Dept: REHABILITATION | Facility: HOSPITAL | Age: 42
End: 2022-06-07
Payer: COMMERCIAL

## 2022-06-07 DIAGNOSIS — M25.561 CHRONIC PAIN OF RIGHT KNEE: Primary | ICD-10-CM

## 2022-06-07 DIAGNOSIS — G89.29 CHRONIC PAIN OF RIGHT KNEE: Primary | ICD-10-CM

## 2022-06-07 PROCEDURE — 97110 THERAPEUTIC EXERCISES: CPT

## 2022-06-07 NOTE — PROGRESS NOTES
Physical Therapy Daily Note     Name: Yolie Russell  Clinic Number: [id number]  Diagnosis:   Encounter Diagnosis   Name Primary?    Chronic pain of right knee Yes     Physician: Dani Hung DO  Precautions: standard   Visit #: 6 of 10  PTA Visit #:   Time In: 1402  Time Out: 1755    Subjective     Pt reports: my knee has been feeling pretty good, pops a little but no pain.  Pain Scale: Yolie rates pain on a scale of 0-10 to be 0 currently.    Objective       Yolie received individual therapeutic exercises to develop strength and stability for 43 minutes including:  SciFit stepper 6 minutes 4 level  Quad sets 3 x 15  4 way right leg raises: SLR, hip ADD, hip ABD, hip extension 3 x 15 with 2.5 lb  Bridging with knee ball 3 x 15  Hamstring curls 3 x 15 blue TB  BOSU ball single leg stand flat side up 3 x 30 seconds  Standing hip adduction with green TB 3 x 15      Right  Strength   Knee extension  5   Knee flexion  5         Written Home Exercises Provided: completed    Education provided re:  Yolie received instruction on applying Kinesio tape to right knee and able to return correct demonstration of taping procedure.  No spiritual or educational barriers to learning provided    Assessment     Patient tolerated well. Pt has met all STG's and progressing well.    This is a 41 y.o. female referred to outpatient physical therapy and presents with a medical diagnosis of right knee pain and demonstrates limitations as described in the problem list. Pt prognosis is Good. Pt will continue to benefit from skilled outpatient physical therapy to address the deficits listed in the problem list, provide pt/family education and to maximize pt's level of independence in the home and community environment.     Goals as follows:  Goals:  Short Term Goals: 2 weeks   1. Pt will be independent in performing right LE Home Exercise Program for strengthening. Goal Met.  2. Pt will  increase right knee strength to 5/5 to allow patient to increase functional activity and mobility. Goal Met.  3. Pt will report decrease in pain to 0/10 at rest and 4/10 or less with activity to improve quality of life. Goal Met.    Long Term Goals: 4 weeks   1. Pt will tolerate 30 to 40 minutes exercise/activity with right knee pain no greater than 2/10.  2. Pt will demonstrate independent taping of right knee for at home management of patella femoral symptoms.       Plan     Continue with established Plan of Care towards PT goals.    Therapist: Geovani Alves, PT  6/7/2022

## 2022-06-10 ENCOUNTER — CLINICAL SUPPORT (OUTPATIENT)
Dept: REHABILITATION | Facility: HOSPITAL | Age: 42
End: 2022-06-10
Payer: COMMERCIAL

## 2022-06-10 DIAGNOSIS — M25.561 CHRONIC PAIN OF RIGHT KNEE: Primary | ICD-10-CM

## 2022-06-10 DIAGNOSIS — G89.29 CHRONIC PAIN OF RIGHT KNEE: Primary | ICD-10-CM

## 2022-06-10 PROCEDURE — 97110 THERAPEUTIC EXERCISES: CPT

## 2022-06-10 NOTE — PROGRESS NOTES
Physical Therapy Daily Note     Name: Yolie Russell  [facility] Number: [id number]  Diagnosis:   Encounter Diagnosis   Name Primary?    Chronic pain of right knee Yes     Physician: Dani Hung DO  Precautions: standard   Visit #: 7 of 10  PTA Visit #:   Time In: 1530  Time Out: 1600    Subjective     Pt reports: no pain but knee still pops.  Pain Scale: Yolie rates pain on a scale of 0-10 to be 0 currently.    Objective       Yolie received individual therapeutic exercises to develop strength and stability for 30 minutes including:  SciFit stepper 6 minutes 4 level  Hamstring curls 3 x 15 blue TB  Quad sets 3 x 15 blue TB  4 way right leg raises: SLR, hip ADD, hip ABD, hip extension 3 x 15 with 3 lb  Bridging with large  ball 3 x 15  Lateral step squats green TB 2 x 15  BOSU ball single leg stand flat side up 2 x 40 seconds  Standing hip adduction with blue TB 3 x 15      Right  Strength   Knee extension  5   Knee flexion  5         Written Home Exercises Provided: completed    Education provided re:  Yolie received instruction on applying Kinesio tape to right knee and able to return correct demonstration of taping procedure.  No spiritual or educational barriers to learning provided    Assessment     Patient tolerated treatment well.    This is a 41 y.o. female referred to outpatient physical therapy and presents with a medical diagnosis of right knee pain and demonstrates limitations as described in the problem list. Pt prognosis is Good. Pt will continue to benefit from skilled outpatient physical therapy to address the deficits listed in the problem list, provide pt/family education and to maximize pt's level of independence in the home and community environment.     Goals as follows:  Goals:  Short Term Goals: 2 weeks   1. Pt will be independent in performing right LE Home Exercise Program for strengthening. Goal Met.  2. Pt will increase right knee strength to  5/5 to allow patient to increase functional activity and mobility. Goal Met.  3. Pt will report decrease in pain to 0/10 at rest and 4/10 or less with activity to improve quality of life. Goal Met.    Long Term Goals: 4 weeks   1. Pt will tolerate 30 to 40 minutes exercise/activity with right knee pain no greater than 2/10.  2. Pt will demonstrate independent taping of right knee for at home management of patella femoral symptoms.       Plan     Continue with established Plan of Care towards PT goals.    Therapist: Geovani Alves, PT  6/10/2022

## 2022-06-16 ENCOUNTER — CLINICAL SUPPORT (OUTPATIENT)
Dept: REHABILITATION | Facility: HOSPITAL | Age: 42
End: 2022-06-16
Payer: COMMERCIAL

## 2022-06-16 DIAGNOSIS — G89.29 CHRONIC PAIN OF RIGHT KNEE: Primary | ICD-10-CM

## 2022-06-16 DIAGNOSIS — M25.561 CHRONIC PAIN OF RIGHT KNEE: Primary | ICD-10-CM

## 2022-06-16 PROCEDURE — 97110 THERAPEUTIC EXERCISES: CPT

## 2022-06-16 NOTE — PROGRESS NOTES
Physical Therapy Daily Note     Name: Yolie Russell  [facility] Number: [id number]  Diagnosis:   Encounter Diagnosis   Name Primary?    Chronic pain of right knee Yes     Physician: Dani Hung DO  Precautions: standard   Visit #: 8 of 10  PTA Visit #:   Time In: 1533  Time Out: 1606    Subjective     Pt reports: no pain in right knee.  Pain Scale: Yolie rates pain on a scale of 0-10 to be 0 currently.    Objective       Yolie received individual therapeutic exercises to develop strength and stability for 33 minutes including:  SciFit stepper 6 minutes 4.5 level  Hamstring curls 3 x 15 blue TB  Single leg bridge 3 x 10  4 way right leg raises: SLR, hip ADD, hip ABD, hip extension 3 x 15 with 3 lb  Lateral step squats blue TB 2 x 15  BOSU ball single leg stand flat side up 2 x 40 seconds  Standing hip adduction with blue TB 3 x 15      Right  Strength   Knee extension  5   Knee flexion  5         Written Home Exercises Provided: completed    Education provided re:  Yolie received instruction on applying Kinesio tape to right knee and able to return correct demonstration of taping procedure.  No spiritual or educational barriers to learning provided    Assessment     Patient tolerated treatment well. Pt able to demonstrate good technique with taping procedure of right knee.    This is a 41 y.o. female referred to outpatient physical therapy and presents with a medical diagnosis of right knee pain and demonstrates limitations as described in the problem list. Pt prognosis is Good. Pt will continue to benefit from skilled outpatient physical therapy to address the deficits listed in the problem list, provide pt/family education and to maximize pt's level of independence in the home and community environment.     Goals as follows:  Goals:  Short Term Goals: 2 weeks   1. Pt will be independent in performing right LE Home Exercise Program for strengthening. Goal Met.  2. Pt  will increase right knee strength to 5/5 to allow patient to increase functional activity and mobility. Goal Met.  3. Pt will report decrease in pain to 0/10 at rest and 4/10 or less with activity to improve quality of life. Goal Met.    Long Term Goals: 4 weeks   1. Pt will tolerate 30 to 40 minutes exercise/activity with right knee pain no greater than 2/10. Goal Met.  2. Pt will demonstrate independent taping of right knee for at home management of patella femoral symptoms.       Plan     Continue with established Plan of Care towards PT goals.    Therapist: Geovani Alves, PT  6/16/2022

## 2022-06-29 ENCOUNTER — CLINICAL SUPPORT (OUTPATIENT)
Dept: REHABILITATION | Facility: HOSPITAL | Age: 42
End: 2022-06-29
Payer: COMMERCIAL

## 2022-06-29 DIAGNOSIS — M25.561 CHRONIC PAIN OF RIGHT KNEE: Primary | ICD-10-CM

## 2022-06-29 DIAGNOSIS — G89.29 CHRONIC PAIN OF RIGHT KNEE: Primary | ICD-10-CM

## 2022-06-29 PROCEDURE — 97110 THERAPEUTIC EXERCISES: CPT

## 2022-06-29 NOTE — PLAN OF CARE
Outpatient Therapy Discharge Summary     Name: Yolie Russell  Clinic Number: [id number]  Diagnosis:   Encounter Diagnosis   Name Primary?    Chronic pain of right knee Yes     Physician: Dani Hung DO  Medical Diagnosis: Right knee pain  Evaluation Date: 5/18/22  Date of Last visit: 6/29/22  Total Visits Received: 9    Right knee pain= 0/10    Right  Strength   Knee extension  5   Knee flexion  5         Assessment    Yolie Russell has met all therapy goals and progressed well with therapy.    Goals:  Short Term Goals: 2 weeks   1. Pt will be independent in performing right LE Home Exercise Program for strengthening. Goal Met.  2. Pt will increase right knee strength to 5/5 to allow patient to increase functional activity and mobility. Goal Met.  3. Pt will report decrease in pain to 0/10 at rest and 4/10 or less with activity to improve quality of life. Goal Met.    Long Term Goals: 4 weeks   1. Pt will tolerate 30 to 40 minutes exercise/activity with right knee pain no greater than 2/10. Goal Met.  2. Pt will demonstrate independent taping of right knee for at home management of patella femoral symptoms. Goal Met.      Discharge reason: Patient has completed the physician's prescription and Patient has met all of her goals    Plan   This patient is discharged from Physical Therapy.      Geovani Alves, PT

## 2022-06-29 NOTE — PROGRESS NOTES
Physical Therapy Daily Note     Name: Yolie Russell  Clinic Number: [id number]  Diagnosis:   Encounter Diagnosis   Name Primary?    Chronic pain of right knee Yes     Physician: Dani Hung DO  Precautions: standard   Visit #: 9 of 10  PTA Visit #:   Time In: 1535  Time Out: 1600    Subjective     Pt reports: no pain in right knee.  Pain Scale: Yolie rates pain on a scale of 0-10 to be 0 currently.    Objective       Yolie received individual therapeutic exercises to develop strength and stability for 25 minutes including:  SciFit stepper 6 minutes 4.5 level  Hamstring curls 3 x 15 blue TB  4 way right leg raises: SLR, hip ADD, hip ABD, hip extension 3 x 15 with 3 lb  Lateral step squats blue TB 2 x 15  BOSU ball single leg stand flat side up 2 x 40 seconds        Right  Strength   Knee extension  5   Knee flexion  5         Written Home Exercises Provided: completed    Education provided re:  Yolie received instruction on applying Kinesio tape to right knee and able to return correct demonstration of taping procedure.  No spiritual or educational barriers to learning provided    Assessment     Patient has met all therapy goals and will discharge from treatment.    This is a 41 y.o. female referred to outpatient physical therapy and presents with a medical diagnosis of right knee pain.     Goals as follows:  Goals:  Short Term Goals: 2 weeks   1. Pt will be independent in performing right LE Home Exercise Program for strengthening. Goal Met.  2. Pt will increase right knee strength to 5/5 to allow patient to increase functional activity and mobility. Goal Met.  3. Pt will report decrease in pain to 0/10 at rest and 4/10 or less with activity to improve quality of life. Goal Met.    Long Term Goals: 4 weeks   1. Pt will tolerate 30 to 40 minutes exercise/activity with right knee pain no greater than 2/10. Goal Met.  2. Pt will demonstrate independent taping of  right knee for at home management of patella femoral symptoms. Goal Met.       Plan     Discharge therapy services.    Therapist: Geovani Alves, PT  6/29/2022

## 2022-08-10 ENCOUNTER — TELEPHONE (OUTPATIENT)
Dept: OBSTETRICS AND GYNECOLOGY | Facility: CLINIC | Age: 42
End: 2022-08-10
Payer: COMMERCIAL

## 2022-08-10 NOTE — TELEPHONE ENCOUNTER
----- Message from Kathy Jones sent at 8/9/2022  8:58 AM CDT -----  Patient called she didn't say what she needed but she wanted a call back @ [phone]

## 2022-08-12 DIAGNOSIS — Z01.818 PRE-OP EVALUATION: Primary | ICD-10-CM

## 2022-08-16 ENCOUNTER — OFFICE VISIT (OUTPATIENT)
Dept: OBSTETRICS AND GYNECOLOGY | Facility: CLINIC | Age: 42
End: 2022-08-16
Payer: COMMERCIAL

## 2022-08-16 VITALS
WEIGHT: 204.38 LBS | DIASTOLIC BLOOD PRESSURE: 74 MMHG | SYSTOLIC BLOOD PRESSURE: 114 MMHG | BODY MASS INDEX: 32.99 KG/M2

## 2022-08-16 DIAGNOSIS — N94.6 DYSMENORRHEA: Primary | ICD-10-CM

## 2022-08-16 PROCEDURE — 3074F SYST BP LT 130 MM HG: CPT | Mod: ,,, | Performed by: OBSTETRICS & GYNECOLOGY

## 2022-08-16 PROCEDURE — 99213 OFFICE O/P EST LOW 20 MIN: CPT | Mod: PBBFAC | Performed by: OBSTETRICS & GYNECOLOGY

## 2022-08-16 PROCEDURE — 99024 POSTOP FOLLOW-UP VISIT: CPT | Mod: ,,, | Performed by: OBSTETRICS & GYNECOLOGY

## 2022-08-16 PROCEDURE — 3078F PR MOST RECENT DIASTOLIC BLOOD PRESSURE < 80 MM HG: ICD-10-PCS | Mod: ,,, | Performed by: OBSTETRICS & GYNECOLOGY

## 2022-08-16 PROCEDURE — 1159F PR MEDICATION LIST DOCUMENTED IN MEDICAL RECORD: ICD-10-PCS | Mod: ,,, | Performed by: OBSTETRICS & GYNECOLOGY

## 2022-08-16 PROCEDURE — 3008F BODY MASS INDEX DOCD: CPT | Mod: ,,, | Performed by: OBSTETRICS & GYNECOLOGY

## 2022-08-16 PROCEDURE — 1159F MED LIST DOCD IN RCRD: CPT | Mod: ,,, | Performed by: OBSTETRICS & GYNECOLOGY

## 2022-08-16 PROCEDURE — 3008F PR BODY MASS INDEX (BMI) DOCUMENTED: ICD-10-PCS | Mod: ,,, | Performed by: OBSTETRICS & GYNECOLOGY

## 2022-08-16 PROCEDURE — 3078F DIAST BP <80 MM HG: CPT | Mod: ,,, | Performed by: OBSTETRICS & GYNECOLOGY

## 2022-08-16 PROCEDURE — 3074F PR MOST RECENT SYSTOLIC BLOOD PRESSURE < 130 MM HG: ICD-10-PCS | Mod: ,,, | Performed by: OBSTETRICS & GYNECOLOGY

## 2022-08-16 PROCEDURE — 99024 PR POST-OP FOLLOW-UP VISIT: ICD-10-PCS | Mod: ,,, | Performed by: OBSTETRICS & GYNECOLOGY

## 2022-08-16 RX ORDER — ONDANSETRON HYDROCHLORIDE 8 MG/1
8 TABLET, FILM COATED ORAL EVERY 8 HOURS
COMMUNITY
Start: 2022-06-06

## 2022-08-16 RX ORDER — PHENTERMINE HYDROCHLORIDE 37.5 MG/1
37.5 TABLET ORAL DAILY
COMMUNITY
Start: 2022-07-08

## 2022-08-16 RX ORDER — ERGOCALCIFEROL 1.25 MG/1
50000 CAPSULE ORAL
COMMUNITY
Start: 2022-06-06

## 2022-08-16 NOTE — H&P (VIEW-ONLY)
[facility] - Obstetrics And Gynecology  Obstetrics & Gynecology  History & Physical    Patient Name: Yolie Russell  MRN: [id number]  Admission Date: (Not on file)  Primary Care Provider: Ted Montelongo MD    Subjective:     Chief Complaint/Reason for Admission:  Admitted for robotically assisted hysterectomy.  Patient also desires elective bilateral salpingo-oophorectomy    History of Present Illness:        Office Visit  5/9/22-colposcopic examination    Presents for colposcopic examination.     Patient has had 2 previous Paps yearly which were negative however HPV other were persistently positive     HPV 16 and 18 were both negative.     She was instructed present for colposcopic examination there has been some delay secondary to workup because patient had moved in did not receive my follow-up letter recommendations.     Speculum exam done today cervix is grossly normal to appearance.  Cervix was painted with ascetic acid.     Thorough colposcopic exam was performed with both white and green light.  There was some mild to at most moderate aceto-white areas noted at 11:31 a.m..  However the squamocolumnar junction was seen 360°.  There was no aceto-white area within the endocervical canal.  Representative biopsies were taken at 11 and 12:00 p.m. another biopsy was taken at 6:00 a.m..  ECC performed.     Monsel's solution applied.  Good hemostasis noted.     Findings were discussed in the office.  We will follow-up pathology report.  She desires contraception has previously used NuvaRing     The options of a NuvaRing versus Mirena IUD discussed she does have moderate dysmenorrhea and menorrhagia.     Depending on pathology report we have also discussed the possibility of proceeding with NovaSure endometrial ablation and tubal ligation.     The option of hysterectomy discussed however we will discussed final management upon review of pathology.     Follow-up appointment in approximately 3-4  weeks.      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     Previously she had requested tubal ligation.  In addition she does have moderate dysmenorrhea she does take over-the-counter medication.     She has stopped NuvaRing approximally 1 year ago.     Not sexually active at present.  She will use condoms if sexual activity occurs.     We discussed Mirena IUD versus proceeding with hysterectomy for definitive management of sterilization, menorrhagia dysmenorrhea and mild dysplasia.     The options of conization of the cervix and IUD of were discussed.     She desires proceeding with hysterectomy We have discussed the options disadvantages and advantages of elective oophorectomy patient to decide.    08/16/2022-preoperative examination    She presents today for preoperative examination.  She desires proceeding with robotically assisted hysterectomy with bilateral salpingo-oophorectomy for definitive management of both menorrhagia dysmenorrhea and cervical dysplasia.  We have discussed the advantages versus disadvantages of elective bilateral oophorectomy.  She understands hormone replacement will be necessary if ovaries are removed.          .    Current Outpatient Medications on File Prior to Visit   Medication Sig    ergocalciferol (ERGOCALCIFEROL) 50,000 unit Cap Take 50,000 Units by mouth every 7 days.    meloxicam (MOBIC) 15 MG tablet TAKE ONE TABLET BY MOUTH ONCE a DAY AS NEEDED    methocarbamoL (ROBAXIN) 750 MG Tab Take 750 mg by mouth nightly as needed.    ondansetron (ZOFRAN) 8 MG tablet Take 8 mg by mouth every 8 (eight) hours.    phentermine (ADIPEX-P) 37.5 mg tablet Take 37.5 mg by mouth once daily.    etonogestreL-ethinyl estradioL (NUVARING) 0.12-0.015 mg/24 hr vaginal ring insert 1 ring VAGINALLY AND LEAVE in continuously FOR 3 WEEKS, THEN REMOVE FOR 1 WEEK (THEN REPEAT with new  ring)     No current facility-administered medications on file prior to visit.       Review of patient's allergies indicates:  No Known Allergies    History reviewed. No pertinent past medical history.  OB History    Para Term  AB Living   4 2     2     SAB IAB Ectopic Multiple Live Births   2              # Outcome Date GA Lbr Antonio/2nd Weight Sex Delivery Anes PTL Lv   4 SAB            3 SAB            2 Para            1 Para              Past Surgical History:   Procedure Laterality Date    DILATION AND CURETTAGE OF UTERUS       Family History     Problem Relation (Age of Onset)    COPD Father    Colon cancer Mother    Diabetes Mother    Kidney disease Sister    Thyroid disease Sister        Tobacco Use    Smoking status: Never Smoker    Smokeless tobacco: Never Used   Substance and Sexual Activity    Alcohol use: Yes     Comment: rare occassions    Drug use: Never    Sexual activity: Not on file     Review of Systems   Respiratory: Negative.    Cardiovascular: Negative.    Neurological: Negative.    Hematological: Negative.      Objective:     Vital Signs (Most Recent):  BP: 114/74 (22 0835) Vital Signs (24h Range):  [unfilled]     Weight: 92.7 kg (204 lb 6.4 oz)  Body mass index is 32.99 kg/m².  Patient's last menstrual period was 2022.    Physical Exam:   Constitutional: She is oriented to person, place, and time. She appears well-developed and well-nourished.    HENT:   Head: Normocephalic.    Eyes: Pupils are equal, round, and reactive to light. EOM are normal.     Cardiovascular: Normal rate, regular rhythm and normal heart sounds.     Pulmonary/Chest: Effort normal and breath sounds normal.        Abdominal: Soft. Bowel sounds are normal.     Genitourinary:    Vagina and uterus normal.      Genitourinary Comments: Uterus is normal size on bimanual examination no adnexal masses noted uterus is noted to be retroflexed but normal size.    Previous workup has revealed mild  dysplasia of ectocervicaland endocervical curettage.             Musculoskeletal: Normal range of motion.       Neurological: She is alert and oriented to person, place, and time.    Skin: Skin is warm.    Psychiatric: She has a normal mood and affect.       Laboratory:    Lab Visit on 08/16/2022   Component Date Value Ref Range Status    Group & Rh 08/16/2022 O NEG   Final    Indirect Yadira 08/16/2022 NEG   Final   Lab Requisition on 08/12/2022   Component Date Value Ref Range Status    COVID-19 Ag 08/12/2022 Negative  Negative, Invalid Final      Lab Visit on 08/16/2022   Component Date Value Ref Range Status    Sodium 08/16/2022 139  136 - 145 mmol/L Final    Potassium 08/16/2022 4.4  3.5 - 5.1 mmol/L Final    Chloride 08/16/2022 106  98 - 107 mmol/L Final    CO2 08/16/2022 24  21 - 32 mmol/L Final    Anion Gap 08/16/2022 13  7 - 16 mmol/L Final    Glucose 08/16/2022 77  74 - 106 mg/dL Final    BUN 08/16/2022 13  7 - 18 mg/dL Final    Creatinine 08/16/2022 0.89  0.55 - 1.02 mg/dL Final    BUN/Creatinine Ratio 08/16/2022 15  6 - 20 Final    Calcium 08/16/2022 9.3  8.5 - 10.1 mg/dL Final    Total Protein 08/16/2022 7.3  6.4 - 8.2 g/dL Final    Albumin 08/16/2022 4.1  3.5 - 5.0 g/dL Final    Globulin 08/16/2022 3.2  2.0 - 4.0 g/dL Final    A/G Ratio 08/16/2022 1.3   Final    Bilirubin, Total 08/16/2022 0.5  0.0 - 1.2 mg/dL Final    Alk Phos 08/16/2022 57  37 - 98 U/L Final    ALT 08/16/2022 27  13 - 56 U/L Final    AST 08/16/2022 17  15 - 37 U/L Final    eGFR 08/16/2022 84  >=60 mL/min/1.73m² Final    Group & Rh 08/16/2022 O NEG   Final    Indirect Yadira 08/16/2022 NEG   Final    Pregnancy, Serum 08/16/2022 Negative  Negative, QNS Final    WBC 08/16/2022 4.16 (A) 4.50 - 11.00 K/uL Final    RBC 08/16/2022 4.17 (A) 4.20 - 5.40 M/uL Final    Hemoglobin 08/16/2022 13.1  12.0 - 16.0 g/dL Final    Hematocrit 08/16/2022 39.4  38.0 - 47.0 % Final    MCV 08/16/2022 94.5  80.0 - 96.0 fL Final     MCH 08/16/2022 31.4 (A) 27.0 - 31.0 pg Final    MCHC 08/16/2022 33.2  32.0 - 36.0 g/dL Final    RDW 08/16/2022 13.4  11.5 - 14.5 % Final    Platelet Count 08/16/2022 263  150 - 400 K/uL Final    MPV 08/16/2022 10.5  9.4 - 12.4 fL Final    Neutrophils % 08/16/2022 42.8 (A) 53.0 - 65.0 % Final    Lymphocytes % 08/16/2022 41.8 (A) 27.0 - 41.0 % Final    Monocytes % 08/16/2022 12.3 (A) 2.0 - 6.0 % Final    Eosinophils % 08/16/2022 1.7  1.0 - 4.0 % Final    Basophils % 08/16/2022 1.2 (A) 0.0 - 1.0 % Final    Immature Granulocytes % 08/16/2022 0.2  0.0 - 0.4 % Final    nRBC, Auto 08/16/2022 0.0  <=0.0 % Final    Neutrophils, Abs 08/16/2022 1.78 (A) 1.80 - 7.70 K/uL Final    Lymphocytes, Absolute 08/16/2022 1.74  1.00 - 4.80 K/uL Final    Monocytes, Absolute 08/16/2022 0.51  0.00 - 0.80 K/uL Final    Eosinophils, Absolute 08/16/2022 0.07  0.00 - 0.50 K/uL Final    Basophils, Absolute 08/16/2022 0.05  0.00 - 0.20 K/uL Final    Immature Granulocytes, Absolute 08/16/2022 0.01  0.00 - 0.04 K/uL Final    nRBC, Absolute 08/16/2022 0.00  <=0.00 x10e3/uL Final    Diff Type 08/16/2022 Auto   Final   Lab Requisition on 08/12/2022   Component Date Value Ref Range Status    COVID-19 Ag 08/12/2022 Negative  Negative, Invalid Final      Diagnostic Results:        Assessment/Plan:     Discussed proceeding with robotically assisted hysterectomy with bilateral salpingo-oophorectomy.    She understands hormone replacement will be necessary if ovaries are removed.    The risk of surgery including anesthetic risk bleeding infection blood clots bowel bladder or ureter injury have been discussed.    We have discussed if any unsuspected cancerous changes noted on pathology follow-up with gyn oncologist would be necessary.    Pyridium  prescribed        Gigi Morris MD  Obstetrics & Gynecology  [facility] - Obstetrics And Gynecology

## 2022-08-16 NOTE — PATIENT INSTRUCTIONS
Discussed proceeding with robotically assisted hysterectomy with bilateral salpingo-oophorectomy.    She understands hormone replacement will be necessary if ovaries are removed.    The risk of surgery including anesthetic risk bleeding infection blood clots bowel bladder or ureter injury have been discussed.    We have discussed if any unsuspected cancerous changes noted on pathology follow-up with gyn oncologist would be necessary.    Pyridium  prescribed

## 2022-08-16 NOTE — PROGRESS NOTES
[facility] - Obstetrics And Gynecology  Obstetrics & Gynecology  History & Physical    Patient Name: Yolie Russell  MRN: [id number]  Admission Date: (Not on file)  Primary Care Provider: Ted Montelongo MD    Subjective:     Chief Complaint/Reason for Admission:  Admitted for robotically assisted hysterectomy.  Patient also desires elective bilateral salpingo-oophorectomy    History of Present Illness:        Office Visit  5/9/22-colposcopic examination    Presents for colposcopic examination.     Patient has had 2 previous Paps yearly which were negative however HPV other were persistently positive     HPV 16 and 18 were both negative.     She was instructed present for colposcopic examination there has been some delay secondary to workup because patient had moved in did not receive my follow-up letter recommendations.     Speculum exam done today cervix is grossly normal to appearance.  Cervix was painted with ascetic acid.     Thorough colposcopic exam was performed with both white and green light.  There was some mild to at most moderate aceto-white areas noted at 11:31 a.m..  However the squamocolumnar junction was seen 360°.  There was no aceto-white area within the endocervical canal.  Representative biopsies were taken at 11 and 12:00 p.m. another biopsy was taken at 6:00 a.m..  ECC performed.     Monsel's solution applied.  Good hemostasis noted.     Findings were discussed in the office.  We will follow-up pathology report.  She desires contraception has previously used NuvaRing     The options of a NuvaRing versus Mirena IUD discussed she does have moderate dysmenorrhea and menorrhagia.     Depending on pathology report we have also discussed the possibility of proceeding with NovaSure endometrial ablation and tubal ligation.     The option of hysterectomy discussed however we will discussed final management upon review of pathology.     Follow-up appointment in approximately 3-4  weeks.      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     Previously she had requested tubal ligation.  In addition she does have moderate dysmenorrhea she does take over-the-counter medication.     She has stopped NuvaRing approximally 1 year ago.     Not sexually active at present.  She will use condoms if sexual activity occurs.     We discussed Mirena IUD versus proceeding with hysterectomy for definitive management of sterilization, menorrhagia dysmenorrhea and mild dysplasia.     The options of conization of the cervix and IUD of were discussed.     She desires proceeding with hysterectomy We have discussed the options disadvantages and advantages of elective oophorectomy patient to decide.    08/16/2022-preoperative examination    She presents today for preoperative examination.  She desires proceeding with robotically assisted hysterectomy with bilateral salpingo-oophorectomy for definitive management of both menorrhagia dysmenorrhea and cervical dysplasia.  We have discussed the advantages versus disadvantages of elective bilateral oophorectomy.  She understands hormone replacement will be necessary if ovaries are removed.          .    Current Outpatient Medications on File Prior to Visit   Medication Sig    ergocalciferol (ERGOCALCIFEROL) 50,000 unit Cap Take 50,000 Units by mouth every 7 days.    meloxicam (MOBIC) 15 MG tablet TAKE ONE TABLET BY MOUTH ONCE a DAY AS NEEDED    methocarbamoL (ROBAXIN) 750 MG Tab Take 750 mg by mouth nightly as needed.    ondansetron (ZOFRAN) 8 MG tablet Take 8 mg by mouth every 8 (eight) hours.    phentermine (ADIPEX-P) 37.5 mg tablet Take 37.5 mg by mouth once daily.    etonogestreL-ethinyl estradioL (NUVARING) 0.12-0.015 mg/24 hr vaginal ring insert 1 ring VAGINALLY AND LEAVE in continuously FOR 3 WEEKS, THEN REMOVE FOR 1 WEEK (THEN REPEAT with new  ring)     No current facility-administered medications on file prior to visit.       Review of patient's allergies indicates:  No Known Allergies    History reviewed. No pertinent past medical history.  OB History    Para Term  AB Living   4 2     2     SAB IAB Ectopic Multiple Live Births   2              # Outcome Date GA Lbr Antonio/2nd Weight Sex Delivery Anes PTL Lv   4 SAB            3 SAB            2 Para            1 Para              Past Surgical History:   Procedure Laterality Date    DILATION AND CURETTAGE OF UTERUS       Family History     Problem Relation (Age of Onset)    COPD Father    Colon cancer Mother    Diabetes Mother    Kidney disease Sister    Thyroid disease Sister        Tobacco Use    Smoking status: Never Smoker    Smokeless tobacco: Never Used   Substance and Sexual Activity    Alcohol use: Yes     Comment: rare occassions    Drug use: Never    Sexual activity: Not on file     Review of Systems   Respiratory: Negative.    Cardiovascular: Negative.    Neurological: Negative.    Hematological: Negative.      Objective:     Vital Signs (Most Recent):  BP: 114/74 (22 0835) Vital Signs (24h Range):  [unfilled]     Weight: 92.7 kg (204 lb 6.4 oz)  Body mass index is 32.99 kg/m².  Patient's last menstrual period was 2022.    Physical Exam:   Constitutional: She is oriented to person, place, and time. She appears well-developed and well-nourished.    HENT:   Head: Normocephalic.    Eyes: Pupils are equal, round, and reactive to light. EOM are normal.     Cardiovascular: Normal rate, regular rhythm and normal heart sounds.     Pulmonary/Chest: Effort normal and breath sounds normal.        Abdominal: Soft. Bowel sounds are normal.     Genitourinary:    Vagina and uterus normal.      Genitourinary Comments: Uterus is normal size on bimanual examination no adnexal masses noted uterus is noted to be retroflexed but normal size.    Previous workup has revealed mild  dysplasia of ectocervicaland endocervical curettage.             Musculoskeletal: Normal range of motion.       Neurological: She is alert and oriented to person, place, and time.    Skin: Skin is warm.    Psychiatric: She has a normal mood and affect.       Laboratory:    Lab Visit on 08/16/2022   Component Date Value Ref Range Status    Group & Rh 08/16/2022 O NEG   Final    Indirect Yadira 08/16/2022 NEG   Final   Lab Requisition on 08/12/2022   Component Date Value Ref Range Status    COVID-19 Ag 08/12/2022 Negative  Negative, Invalid Final      Lab Visit on 08/16/2022   Component Date Value Ref Range Status    Sodium 08/16/2022 139  136 - 145 mmol/L Final    Potassium 08/16/2022 4.4  3.5 - 5.1 mmol/L Final    Chloride 08/16/2022 106  98 - 107 mmol/L Final    CO2 08/16/2022 24  21 - 32 mmol/L Final    Anion Gap 08/16/2022 13  7 - 16 mmol/L Final    Glucose 08/16/2022 77  74 - 106 mg/dL Final    BUN 08/16/2022 13  7 - 18 mg/dL Final    Creatinine 08/16/2022 0.89  0.55 - 1.02 mg/dL Final    BUN/Creatinine Ratio 08/16/2022 15  6 - 20 Final    Calcium 08/16/2022 9.3  8.5 - 10.1 mg/dL Final    Total Protein 08/16/2022 7.3  6.4 - 8.2 g/dL Final    Albumin 08/16/2022 4.1  3.5 - 5.0 g/dL Final    Globulin 08/16/2022 3.2  2.0 - 4.0 g/dL Final    A/G Ratio 08/16/2022 1.3   Final    Bilirubin, Total 08/16/2022 0.5  0.0 - 1.2 mg/dL Final    Alk Phos 08/16/2022 57  37 - 98 U/L Final    ALT 08/16/2022 27  13 - 56 U/L Final    AST 08/16/2022 17  15 - 37 U/L Final    eGFR 08/16/2022 84  >=60 mL/min/1.73m² Final    Group & Rh 08/16/2022 O NEG   Final    Indirect Yadira 08/16/2022 NEG   Final    Pregnancy, Serum 08/16/2022 Negative  Negative, QNS Final    WBC 08/16/2022 4.16 (A) 4.50 - 11.00 K/uL Final    RBC 08/16/2022 4.17 (A) 4.20 - 5.40 M/uL Final    Hemoglobin 08/16/2022 13.1  12.0 - 16.0 g/dL Final    Hematocrit 08/16/2022 39.4  38.0 - 47.0 % Final    MCV 08/16/2022 94.5  80.0 - 96.0 fL Final     MCH 08/16/2022 31.4 (A) 27.0 - 31.0 pg Final    MCHC 08/16/2022 33.2  32.0 - 36.0 g/dL Final    RDW 08/16/2022 13.4  11.5 - 14.5 % Final    Platelet Count 08/16/2022 263  150 - 400 K/uL Final    MPV 08/16/2022 10.5  9.4 - 12.4 fL Final    Neutrophils % 08/16/2022 42.8 (A) 53.0 - 65.0 % Final    Lymphocytes % 08/16/2022 41.8 (A) 27.0 - 41.0 % Final    Monocytes % 08/16/2022 12.3 (A) 2.0 - 6.0 % Final    Eosinophils % 08/16/2022 1.7  1.0 - 4.0 % Final    Basophils % 08/16/2022 1.2 (A) 0.0 - 1.0 % Final    Immature Granulocytes % 08/16/2022 0.2  0.0 - 0.4 % Final    nRBC, Auto 08/16/2022 0.0  <=0.0 % Final    Neutrophils, Abs 08/16/2022 1.78 (A) 1.80 - 7.70 K/uL Final    Lymphocytes, Absolute 08/16/2022 1.74  1.00 - 4.80 K/uL Final    Monocytes, Absolute 08/16/2022 0.51  0.00 - 0.80 K/uL Final    Eosinophils, Absolute 08/16/2022 0.07  0.00 - 0.50 K/uL Final    Basophils, Absolute 08/16/2022 0.05  0.00 - 0.20 K/uL Final    Immature Granulocytes, Absolute 08/16/2022 0.01  0.00 - 0.04 K/uL Final    nRBC, Absolute 08/16/2022 0.00  <=0.00 x10e3/uL Final    Diff Type 08/16/2022 Auto   Final   Lab Requisition on 08/12/2022   Component Date Value Ref Range Status    COVID-19 Ag 08/12/2022 Negative  Negative, Invalid Final      Diagnostic Results:        Assessment/Plan:     Discussed proceeding with robotically assisted hysterectomy with bilateral salpingo-oophorectomy.    She understands hormone replacement will be necessary if ovaries are removed.    The risk of surgery including anesthetic risk bleeding infection blood clots bowel bladder or ureter injury have been discussed.    We have discussed if any unsuspected cancerous changes noted on pathology follow-up with gyn oncologist would be necessary.    Pyridium  prescribed        Gigi Morris MD  Obstetrics & Gynecology  [facility] - Obstetrics And Gynecology

## 2022-08-22 ENCOUNTER — ANESTHESIA (OUTPATIENT)
Dept: SURGERY | Facility: HOSPITAL | Age: 42
End: 2022-08-22
Payer: COMMERCIAL

## 2022-08-22 ENCOUNTER — ANESTHESIA EVENT (OUTPATIENT)
Dept: SURGERY | Facility: HOSPITAL | Age: 42
End: 2022-08-22
Payer: COMMERCIAL

## 2022-08-22 ENCOUNTER — HOSPITAL ENCOUNTER (OUTPATIENT)
Facility: HOSPITAL | Age: 42
Discharge: HOME | End: 2022-08-23
Attending: OBSTETRICS & GYNECOLOGY | Admitting: OBSTETRICS & GYNECOLOGY
Payer: COMMERCIAL

## 2022-08-22 DIAGNOSIS — N94.6 DYSMENORRHEA: ICD-10-CM

## 2022-08-22 DIAGNOSIS — Z98.890 STATUS POST SURGERY: Primary | ICD-10-CM

## 2022-08-22 DIAGNOSIS — N92.4 EXCESSIVE BLEEDING IN PREMENOPAUSAL PERIOD: ICD-10-CM

## 2022-08-22 DIAGNOSIS — N87.9 CERVICAL DYSPLASIA: ICD-10-CM

## 2022-08-22 LAB
B-HCG UR QL: NEGATIVE
CTP QC/QA: YES
HCT VFR BLD AUTO: 38.1 % (ref 38–47)
HGB BLD-MCNC: 12.5 G/DL (ref 12–16)
INDIRECT COOMBS: NORMAL
POC URINE HCG: NEGATIVE
RH BLD: NORMAL

## 2022-08-22 PROCEDURE — 27000165 HC TUBE, ETT CUFFED: Performed by: ANESTHESIOLOGY

## 2022-08-22 PROCEDURE — 25000003 PHARM REV CODE 250: Performed by: NURSE ANESTHETIST, CERTIFIED REGISTERED

## 2022-08-22 PROCEDURE — 25000003 PHARM REV CODE 250: Performed by: OBSTETRICS & GYNECOLOGY

## 2022-08-22 PROCEDURE — 37000009 HC ANESTHESIA EA ADD 15 MINS: Performed by: OBSTETRICS & GYNECOLOGY

## 2022-08-22 PROCEDURE — 27000716 HC OXISENSOR PROBE, ANY SIZE: Performed by: ANESTHESIOLOGY

## 2022-08-22 PROCEDURE — 58571 TLH W/T/O 250 G OR LESS: CPT | Mod: ,,, | Performed by: OBSTETRICS & GYNECOLOGY

## 2022-08-22 PROCEDURE — 37000008 HC ANESTHESIA 1ST 15 MINUTES: Performed by: OBSTETRICS & GYNECOLOGY

## 2022-08-22 PROCEDURE — 81025 URINE PREGNANCY TEST: CPT | Performed by: OBSTETRICS & GYNECOLOGY

## 2022-08-22 PROCEDURE — 63600175 PHARM REV CODE 636 W HCPCS: Performed by: OBSTETRICS & GYNECOLOGY

## 2022-08-22 PROCEDURE — 27000510 HC BLANKET BAIR HUGGER ANY SIZE: Performed by: ANESTHESIOLOGY

## 2022-08-22 PROCEDURE — 36415 COLL VENOUS BLD VENIPUNCTURE: CPT | Performed by: OBSTETRICS & GYNECOLOGY

## 2022-08-22 PROCEDURE — 71000033 HC RECOVERY, INTIAL HOUR: Performed by: OBSTETRICS & GYNECOLOGY

## 2022-08-22 PROCEDURE — 85014 HEMATOCRIT: CPT | Performed by: OBSTETRICS & GYNECOLOGY

## 2022-08-22 PROCEDURE — D9220A PRA ANESTHESIA: Mod: ANES,,, | Performed by: ANESTHESIOLOGY

## 2022-08-22 PROCEDURE — 27000509 HC TUBE SALEM SUMP ANY SIZE: Performed by: ANESTHESIOLOGY

## 2022-08-22 PROCEDURE — 36000713 HC OR TIME LEV V EA ADD 15 MIN: Performed by: OBSTETRICS & GYNECOLOGY

## 2022-08-22 PROCEDURE — 86850 RBC ANTIBODY SCREEN: CPT | Performed by: OBSTETRICS & GYNECOLOGY

## 2022-08-22 PROCEDURE — D9220A PRA ANESTHESIA: ICD-10-PCS | Mod: ANES,,, | Performed by: ANESTHESIOLOGY

## 2022-08-22 PROCEDURE — 27000655: Performed by: ANESTHESIOLOGY

## 2022-08-22 PROCEDURE — C2628 CATHETER, OCCLUSION: HCPCS | Performed by: OBSTETRICS & GYNECOLOGY

## 2022-08-22 PROCEDURE — 94761 N-INVAS EAR/PLS OXIMETRY MLT: CPT

## 2022-08-22 PROCEDURE — 63600175 PHARM REV CODE 636 W HCPCS: Performed by: NURSE ANESTHETIST, CERTIFIED REGISTERED

## 2022-08-22 PROCEDURE — 71000016 HC POSTOP RECOV ADDL HR: Performed by: OBSTETRICS & GYNECOLOGY

## 2022-08-22 PROCEDURE — 71000015 HC POSTOP RECOV 1ST HR: Performed by: OBSTETRICS & GYNECOLOGY

## 2022-08-22 PROCEDURE — 27202344 HC EYESHIELD: Performed by: ANESTHESIOLOGY

## 2022-08-22 PROCEDURE — D9220A PRA ANESTHESIA: Mod: CRNA,,, | Performed by: NURSE ANESTHETIST, CERTIFIED REGISTERED

## 2022-08-22 PROCEDURE — 36000712 HC OR TIME LEV V 1ST 15 MIN: Performed by: OBSTETRICS & GYNECOLOGY

## 2022-08-22 PROCEDURE — 58571 PR LAPAROSCOPY W TOT HYSTERECTUTERUS <=250 GRAM  W TUBE/OVARY: ICD-10-PCS | Mod: ,,, | Performed by: OBSTETRICS & GYNECOLOGY

## 2022-08-22 PROCEDURE — D9220A PRA ANESTHESIA: ICD-10-PCS | Mod: CRNA,,, | Performed by: NURSE ANESTHETIST, CERTIFIED REGISTERED

## 2022-08-22 PROCEDURE — 27201423 OPTIME MED/SURG SUP & DEVICES STERILE SUPPLY: Performed by: OBSTETRICS & GYNECOLOGY

## 2022-08-22 PROCEDURE — 27000689 HC BLADE LARYNGOSCOPE ANY SIZE: Performed by: ANESTHESIOLOGY

## 2022-08-22 RX ORDER — ONDANSETRON 2 MG/ML
4 INJECTION INTRAMUSCULAR; INTRAVENOUS EVERY 6 HOURS PRN
Status: DISCONTINUED | OUTPATIENT
Start: 2022-08-22 | End: 2022-08-23 | Stop reason: HOSPADM

## 2022-08-22 RX ORDER — MIDAZOLAM HYDROCHLORIDE 1 MG/ML
INJECTION INTRAMUSCULAR; INTRAVENOUS
Status: DISCONTINUED | OUTPATIENT
Start: 2022-08-22 | End: 2022-08-22

## 2022-08-22 RX ORDER — MEPERIDINE HYDROCHLORIDE 25 MG/ML
25 INJECTION INTRAMUSCULAR; INTRAVENOUS; SUBCUTANEOUS ONCE AS NEEDED
Status: DISCONTINUED | OUTPATIENT
Start: 2022-08-22 | End: 2022-08-22 | Stop reason: HOSPADM

## 2022-08-22 RX ORDER — GLYCOPYRROLATE 0.2 MG/ML
INJECTION INTRAMUSCULAR; INTRAVENOUS
Status: DISCONTINUED | OUTPATIENT
Start: 2022-08-22 | End: 2022-08-22

## 2022-08-22 RX ORDER — FUROSEMIDE 10 MG/ML
INJECTION INTRAMUSCULAR; INTRAVENOUS
Status: DISCONTINUED | OUTPATIENT
Start: 2022-08-22 | End: 2022-08-22

## 2022-08-22 RX ORDER — ONDANSETRON 2 MG/ML
INJECTION INTRAMUSCULAR; INTRAVENOUS
Status: DISCONTINUED | OUTPATIENT
Start: 2022-08-22 | End: 2022-08-22

## 2022-08-22 RX ORDER — LIDOCAINE HYDROCHLORIDE 20 MG/ML
INJECTION, SOLUTION EPIDURAL; INFILTRATION; INTRACAUDAL; PERINEURAL
Status: DISCONTINUED | OUTPATIENT
Start: 2022-08-22 | End: 2022-08-22

## 2022-08-22 RX ORDER — DIPHENHYDRAMINE HYDROCHLORIDE 50 MG/ML
25 INJECTION INTRAMUSCULAR; INTRAVENOUS EVERY 6 HOURS PRN
Status: DISCONTINUED | OUTPATIENT
Start: 2022-08-22 | End: 2022-08-22 | Stop reason: HOSPADM

## 2022-08-22 RX ORDER — MORPHINE SULFATE 10 MG/ML
4 INJECTION INTRAMUSCULAR; INTRAVENOUS; SUBCUTANEOUS
Status: DISCONTINUED | OUTPATIENT
Start: 2022-08-22 | End: 2022-08-23 | Stop reason: HOSPADM

## 2022-08-22 RX ORDER — CEFAZOLIN SODIUM 1 G/3ML
INJECTION, POWDER, FOR SOLUTION INTRAMUSCULAR; INTRAVENOUS
Status: DISCONTINUED | OUTPATIENT
Start: 2022-08-22 | End: 2022-08-22

## 2022-08-22 RX ORDER — HYDROMORPHONE HYDROCHLORIDE 2 MG/ML
0.5 INJECTION, SOLUTION INTRAMUSCULAR; INTRAVENOUS; SUBCUTANEOUS EVERY 5 MIN PRN
Status: DISCONTINUED | OUTPATIENT
Start: 2022-08-22 | End: 2022-08-22 | Stop reason: HOSPADM

## 2022-08-22 RX ORDER — FAMOTIDINE 20 MG/1
20 TABLET, FILM COATED ORAL 2 TIMES DAILY
Status: DISCONTINUED | OUTPATIENT
Start: 2022-08-22 | End: 2022-08-23 | Stop reason: HOSPADM

## 2022-08-22 RX ORDER — ONDANSETRON 2 MG/ML
4 INJECTION INTRAMUSCULAR; INTRAVENOUS DAILY PRN
Status: DISCONTINUED | OUTPATIENT
Start: 2022-08-22 | End: 2022-08-22 | Stop reason: HOSPADM

## 2022-08-22 RX ORDER — FENTANYL CITRATE 50 UG/ML
INJECTION, SOLUTION INTRAMUSCULAR; INTRAVENOUS
Status: DISCONTINUED | OUTPATIENT
Start: 2022-08-22 | End: 2022-08-22

## 2022-08-22 RX ORDER — MORPHINE SULFATE 10 MG/ML
4 INJECTION INTRAMUSCULAR; INTRAVENOUS; SUBCUTANEOUS EVERY 5 MIN PRN
Status: DISCONTINUED | OUTPATIENT
Start: 2022-08-22 | End: 2022-08-22 | Stop reason: HOSPADM

## 2022-08-22 RX ORDER — PHENAZOPYRIDINE HYDROCHLORIDE 200 MG/1
200 TABLET, FILM COATED ORAL ONCE
COMMUNITY

## 2022-08-22 RX ORDER — PROPOFOL 10 MG/ML
VIAL (ML) INTRAVENOUS
Status: DISCONTINUED | OUTPATIENT
Start: 2022-08-22 | End: 2022-08-22

## 2022-08-22 RX ORDER — DEXAMETHASONE SODIUM PHOSPHATE 4 MG/ML
INJECTION, SOLUTION INTRA-ARTICULAR; INTRALESIONAL; INTRAMUSCULAR; INTRAVENOUS; SOFT TISSUE
Status: DISCONTINUED | OUTPATIENT
Start: 2022-08-22 | End: 2022-08-22

## 2022-08-22 RX ORDER — SODIUM CHLORIDE 0.9 % (FLUSH) 0.9 %
10 SYRINGE (ML) INJECTION
Status: DISCONTINUED | OUTPATIENT
Start: 2022-08-22 | End: 2022-08-23 | Stop reason: HOSPADM

## 2022-08-22 RX ORDER — BISACODYL 10 MG
10 SUPPOSITORY, RECTAL RECTAL DAILY PRN
Status: DISCONTINUED | OUTPATIENT
Start: 2022-08-22 | End: 2022-08-23 | Stop reason: HOSPADM

## 2022-08-22 RX ORDER — NEOSTIGMINE METHYLSULFATE 1 MG/ML
INJECTION, SOLUTION INTRAVENOUS
Status: DISCONTINUED | OUTPATIENT
Start: 2022-08-22 | End: 2022-08-22

## 2022-08-22 RX ORDER — TRAMADOL HYDROCHLORIDE 50 MG/1
50 TABLET ORAL EVERY 4 HOURS PRN
Status: DISCONTINUED | OUTPATIENT
Start: 2022-08-22 | End: 2022-08-23 | Stop reason: HOSPADM

## 2022-08-22 RX ORDER — SODIUM CHLORIDE, SODIUM LACTATE, POTASSIUM CHLORIDE, CALCIUM CHLORIDE 600; 310; 30; 20 MG/100ML; MG/100ML; MG/100ML; MG/100ML
INJECTION, SOLUTION INTRAVENOUS CONTINUOUS
Status: DISCONTINUED | OUTPATIENT
Start: 2022-08-22 | End: 2022-08-22

## 2022-08-22 RX ORDER — PHENYLEPHRINE HYDROCHLORIDE 10 MG/ML
INJECTION INTRAVENOUS
Status: DISCONTINUED | OUTPATIENT
Start: 2022-08-22 | End: 2022-08-22

## 2022-08-22 RX ORDER — ROCURONIUM BROMIDE 10 MG/ML
INJECTION, SOLUTION INTRAVENOUS
Status: DISCONTINUED | OUTPATIENT
Start: 2022-08-22 | End: 2022-08-22

## 2022-08-22 RX ORDER — MORPHINE SULFATE 8 MG/ML
INJECTION INTRAMUSCULAR; INTRAVENOUS; SUBCUTANEOUS
Status: DISCONTINUED | OUTPATIENT
Start: 2022-08-22 | End: 2022-08-22

## 2022-08-22 RX ADMIN — FENTANYL CITRATE 100 MCG: 50 INJECTION INTRAMUSCULAR; INTRAVENOUS at 08:08

## 2022-08-22 RX ADMIN — SODIUM CHLORIDE, POTASSIUM CHLORIDE, SODIUM LACTATE AND CALCIUM CHLORIDE: 600; 310; 30; 20 INJECTION, SOLUTION INTRAVENOUS at 09:08

## 2022-08-22 RX ADMIN — DEXAMETHASONE SODIUM PHOSPHATE 8 MG: 4 INJECTION, SOLUTION INTRA-ARTICULAR; INTRALESIONAL; INTRAMUSCULAR; INTRAVENOUS; SOFT TISSUE at 07:08

## 2022-08-22 RX ADMIN — ONDANSETRON 4 MG: 2 INJECTION INTRAMUSCULAR; INTRAVENOUS at 06:08

## 2022-08-22 RX ADMIN — MIDAZOLAM 2 MG: 1 INJECTION INTRAMUSCULAR; INTRAVENOUS at 07:08

## 2022-08-22 RX ADMIN — ROCURONIUM BROMIDE 50 MG: 10 INJECTION, SOLUTION INTRAVENOUS at 07:08

## 2022-08-22 RX ADMIN — NEOSTIGMINE METHYLSULFATE 3 MG: 1 INJECTION INTRAVENOUS at 10:08

## 2022-08-22 RX ADMIN — FAMOTIDINE 20 MG: 20 TABLET ORAL at 09:08

## 2022-08-22 RX ADMIN — PHENYLEPHRINE HYDROCHLORIDE 100 MCG: 10 INJECTION INTRAVENOUS at 09:08

## 2022-08-22 RX ADMIN — SODIUM CHLORIDE, POTASSIUM CHLORIDE, SODIUM LACTATE AND CALCIUM CHLORIDE: 600; 310; 30; 20 INJECTION, SOLUTION INTRAVENOUS at 07:08

## 2022-08-22 RX ADMIN — CEFAZOLIN 2 G: 1 INJECTION, POWDER, FOR SOLUTION INTRAMUSCULAR; INTRAVENOUS; PARENTERAL at 07:08

## 2022-08-22 RX ADMIN — FENTANYL CITRATE 100 MCG: 50 INJECTION INTRAMUSCULAR; INTRAVENOUS at 07:08

## 2022-08-22 RX ADMIN — LIDOCAINE HYDROCHLORIDE 75 MG: 20 INJECTION, SOLUTION EPIDURAL; INFILTRATION; INTRACAUDAL; PERINEURAL at 07:08

## 2022-08-22 RX ADMIN — PROPOFOL 150 MG: 10 INJECTION, EMULSION INTRAVENOUS at 07:08

## 2022-08-22 RX ADMIN — ONDANSETRON 8 MG: 2 INJECTION INTRAMUSCULAR; INTRAVENOUS at 07:08

## 2022-08-22 RX ADMIN — GLYCOPYRROLATE 0.4 MG: 0.2 INJECTION INTRAMUSCULAR; INTRAVENOUS at 10:08

## 2022-08-22 RX ADMIN — FUROSEMIDE 10 MG: 10 INJECTION, SOLUTION INTRAMUSCULAR; INTRAVENOUS at 09:08

## 2022-08-22 RX ADMIN — MORPHINE SULFATE 8 MG: 8 INJECTION INTRAVENOUS at 09:08

## 2022-08-22 RX ADMIN — FAMOTIDINE 20 MG: 20 TABLET ORAL at 12:08

## 2022-08-22 NOTE — OR NURSING
1020 REC'D TO PACU IN STABLE COND. PT SLEEPING, WAKES TO VOICE , VS STABLE. NO DISTRESS NOTED @ THIS TIME.      1050 TRANSFERRED TO ROOM 2  IN STABLE COND. PT AWAKE , VS STABLE. BEDSIDE REPORT GIVEN TO  RICH CASEY RN. NO DISTRESS NOTED @ THIS TIME.

## 2022-08-22 NOTE — PLAN OF CARE
To Room 548 via stretcher. No acute distress noted. Resp even and unlabored. Skin warm and dry to touch. Denies any pain.

## 2022-08-22 NOTE — PROGRESS NOTES
Patient alert postoperatively.  Surgery discussed.     Bledsoe catheter was removed in recovery.  She has been up and voiding.  Tolerating liquids.      Desires regular diet.      Lungs are clear     Abdomen soft nondistended bowel sounds active.    Assessment-doing very well postoperatively.      Plan-up with assistance for voiding intermittently tonight.  Progressed diet for dinner tonight.    P.o. pain medicines as well as IV morphine or available at p.r.n. request.

## 2022-08-22 NOTE — TRANSFER OF CARE
"Anesthesia Transfer of Care Note    Patient: Yolie Russell    Procedure(s) Performed: Procedure(s) (LRB):  XI ROBOTIC HYSTERECTOMY (N/A)  XI ROBOTIC SALPINGO-OOPHORECTOMY (Bilateral)  CYSTOSCOPY (N/A)    Patient location: PACU    Anesthesia Type: general    Transport from OR: Transported from OR on 6-10 L/min O2 by face mask with adequate spontaneous ventilation    Post pain: adequate analgesia    Post assessment: no apparent anesthetic complications and tolerated procedure well    Post vital signs: stable    Level of consciousness: responds to stimulation and awake    Nausea/Vomiting: no nausea/vomiting    Complications: none    Transfer of care protocol was followed      Last vitals:   Visit Vitals  BP (!) 112/56 (BP Location: Left arm, Patient Position: Lying)   Pulse 73   Temp 36.8 °C (98.2 °F)   Resp 13   Ht 5' 7" (1.702 m)   Wt 91.6 kg (202 lb)   LMP 07/29/2022   SpO2 100%   Breastfeeding No   BMI 31.64 kg/m²     "

## 2022-08-22 NOTE — OP NOTE
Preoperative diagnosis-mild dysplasia of cervix and endocervix, dysmenorrhea, menorrhagia       postoperative diagnosis-same    Findings normal-appearing uterus fallopian tubes and ovaries.  No evidence of endometriosis.    Procedures-robotically assisted hysterectomy with bilateral salpingo-oophorectomy.  Cystoscopy done postprocedure revealed efflux of yellow orange urine through each ureteral orifice no evidence of bladder injury.    Surgeon  -  Dr Regino Morris     Patient was placed under general anesthesia by anesthesiologist.    She was prepped and draped in usual manner for abdominal and vaginal surgery.  She was given 2  gms of Ancef. Time-out was called patient and procedure reviewed.  Bladder was drained with a Bledsoe.  Speculum inserted.  Upper lip of the cervix grasped with a sharp tooth tenaculum.    The uterus and cervix were sounded to 8 Cm.  Cervix dilated to a 8mm Hegar dilator.    0 Vicryl stick ties were placed at 3 and 9:00 a.m. of the cervix and tagged    8 cm intrauterine manipulator with cervical ring was placed in usual manner.  Intrauterine bulb insufflated following by insufflation of the vaginal bulb.    Physician re gloved.  Attention was turned abdominally.      A 2 cm incision was made just above the umbilicus.  Abdomen was lifted upward with towel clamps.  Varies needle was inserted without difficulty.  CO2 applied at 6 L per minute  The abdomen was insufflated without difficulty.  Intra-abdominal pressure remained less than 15 mm of mercury throughout insufflation.    8 mm trocar was inserted without difficulty.  Scope inserted through sleep noted to be intra-abdominally.  No evidence of abdominal or pelvic adhesions.  1 cm incisions were made in the left upper quadrant, right upper quadrant and right lateral quadrants.  Thereafter 8 mm trocars were then inserted under direct visualization.      The de Luan robot was then docked from  a leftlateral side position. The #2 Laparoscopic  arm attached to the supraumbilical port.  The #1 arm was  attached to the left upper quadrant port .  The #3 arm was attached to the right upper quadrant port and the #4 arm was attached to the right  Lateral quadrant port.    Through the #1 arm tissue vessel sealer was inserted. Through the  #3 arm monopolar cautery scissors were inserted and through the #4 arm bipolar fenestrated forceps were inserted.    Instruments were then directed to the fundal aspect of the uterus under direct visualization.  I then assumed control at the console    My vaginal assistant then deviated the uterus up into the patient's right.  I  used the fenestrated forceps to lift the left ovary upward   The left infundibulopelvic ligament and vessels were grasped with the tissue vessel sealer cauterized and transected with enclosed blade.  Dissection was carried across the left  infundibulopelvic ligament and vessels.  Thereafter the round ligament was grasped with tissue vessel sealer, cauterized and transected in the midportion.  Thereafter the dissection was carried down the upper 2/3 of the broad ligament along the left side of the uterus cauterizing with tissue vessel sealer and transecting with enclosed blade.    Attention was then turned to the right side.  The right ovary was lifted upward with fenestrated forceps.  The right  infundibulopelvic  ligament and vessels were grasped with the tissue vessel sealer cauterized and transected.  Dissection was then carried across the midportion of the right round ligament and down the upper 2/3 of the right broad ligament.    At this time my vaginal assistant deviated the uterus posteriorly.  Using the fenestrated forceps I lifted upward on the bladder flap peritoneum and using monopolar scissors began developing the bladder flap anteriorly. The bladder flap peritoneum was lifted upward and the bladder flap developed by blunt dissection with the tissue vessel sealer beneath the bladder.  Once  the bladder flap was bluntly dissected, the vaginal ring was easily through the vaginal mucosa anteriorly.    I then performed an anterior colpotomy cauterizing with monopolar cautery scissors along the upper inner bevel of the cervical ring.  Dissection with monopolar cautery scissors was begun in the midline and carried out to the right and left lateral sides anteriorly.    My vaginal assistant then deviated the uterus anteriorly and all instruments were placed in the posterior cul-de-sac.  I used the fenestrated forceps to push upward on the uterine fundus.  The cervical ring was easily seen posteriorly.  I then cauterized along the upper bevel of the cervical ring posteriorly beginning in the midline and dissecting to the right and left side around the ring.    I then had my assistant deviate the uterus upward and to the patient's right putting stretch on the left uterine vessels.  Using tissue vessel sealer the uterine vessels along the lower uterine segment and cervix were grasped cauterized and transected with enclosed blade. The dissection was carried down the left lateral aspect of the lower uterine segment and cervix alternating with tissue vessel sealer and monopolar cautery scissors until the cervix was completely released from its cervical attachment on the patient's left.    Attention was then turned to the patient's right as my vaginal assistant deviated the uterus to the left putting stretch on the right uterine vessels.  Uterine vessels were grasped with tissue vessel sealer cauterized and transected with enclosed blade.  Dissection was carried down the lateral aspect of the right lower uterine segment and cervix alternating with tissue vessel sealer and monopolar cautery scissors until the cervix was completely released from its vaginal attachment on the right.    My vaginal assistant then pulled the uterus with ovaries and fallopian tubes through the vaginal cuff.  Vaginal bulb was replaced in the  abdomen re insufflated.    My abdominal assistant then removed the monopolar scissors and replace this with a ProGrasp instrument.  The tissue vessel sealer was removed and my abdominal assistant placed a suction irrigation apparatus to thoroughly irrigate and suction the vaginal cuff area.  Thereafter a 0 Vicryl suture was passed through the left upper quadrant port and needle  inserted.    I lifted upward on the bladder flap peritoneum with fenestrated bipolar forceps.  I then used the ProGrasp instrument to lift upward on the anterior aspect of the vaginal cuff.  The left lateral aspect of the vaginal cuff was then closed using interrupted number 0 Vicryl sutures.  Two sutures were placed along the left lateral aspect of the vaginal cuff.  This needle was removed as a 2nd suture was passed.  The 2nd suture was used to place interrupted sutures along the right lateral aspect of the vaginal cuff and tied manually.  Second needle was removed as a 3rd suture was passed.  The 3rd and 4th sutures were used to continue closing the vaginal cuff in the midportion with interrupted sutures tied manually.  Third needle was removed.  The 4th needle was removed when used.  The pelvis was then thoroughly irrigated and suctioned with saline.  Examined under low pressure  Excellent hemostasis was noted.  A 5th suture was passed.  The bladder flap peritoneum was then tacked to the posterior peritoneum across the vaginal cuff area with a figure-of-eight 0 Vicryl suture.  Again excellent hemostasis was noted after examination under low pressure    Instruments were removed and CO2 allowed to escape the camera was shut off.    I then re scrubbed, re gowned and gloved.    I  performed a cystoscopic examination.  There was no evidence of bladder injury eflux of yellow orange urine was noted through each ureteral orifice.  Patient had been given Pyridium prior to the procedure.  Bledsoe catheter was reinserted.    Estimated blood  loss 35     She was returned recovery room in good condition.

## 2022-08-22 NOTE — ANESTHESIA PROCEDURE NOTES
Intubation    Date/Time: 8/22/2022 7:44 AM  Performed by: Nicole Lea CRNA  Authorized by: Nicole Lea CRNA     Intubation:     Induction:  Intravenous    Intubated:  Postinduction    Mask Ventilation:  Easy mask    Attempts:  1    Attempted By:  CRNA    Blade:  Ness 2    Laryngeal View Grade: Grade I - full view of cords      Difficult Airway Encountered?: No      Complications:  None    Airway Device:  Oral endotracheal tube    Airway Device Size:  7.0    Style/Cuff Inflation:  Cuffed (inflated to minimal occlusive pressure)    Tube secured:  21    Secured at:  The lips    Placement Verified By:  Capnometry    Complicating Factors:  None    Findings Post-Intubation:  BS equal bilateral and atraumatic/condition of teeth unchanged

## 2022-08-22 NOTE — ANESTHESIA PREPROCEDURE EVALUATION
08/22/2022  Yolie Russell is a 41 y.o., female.      Pre-op Assessment    I have reviewed the Patient Summary Reports.    I have reviewed the NPO Status.   I have reviewed the Medications.     Review of Systems  Anesthesia Hx:  No problems with previous Anesthesia  Denies Family Hx of Anesthesia complications.   Denies Personal Hx of Anesthesia complications.   Social:  Non-Smoker, No Alcohol Use    Hematology/Oncology:  Hematology Normal   Oncology Normal     EENT/Dental:EENT/Dental Normal   Cardiovascular:  Cardiovascular Normal Exercise tolerance: good     Pulmonary:  Pulmonary Normal    Renal/:  Renal/ Normal     Hepatic/GI:  Hepatic/GI Normal    Musculoskeletal:  Musculoskeletal Normal    OB/GYN/PEDS:  dysmenorrhea   Neurological:  Neurology Normal    Endocrine:  Endocrine Normal  Obesity / BMI > 30  Dermatological:  Skin Normal    Psych:  Psychiatric Normal           Physical Exam  General: Well nourished, Cooperative and Alert    Airway:  Mallampati: II   Mouth Opening: Normal  TM Distance: Normal  Tongue: Normal  Neck ROM: Normal ROM    Dental:  Intact    Chest/Lungs:  Clear to auscultation    Heart:  Rate: Normal  Rhythm: Regular Rhythm        Chemistry        Component Value Date/Time     08/16/2022 0758    K 4.4 08/16/2022 0758     08/16/2022 0758    CO2 24 08/16/2022 0758    BUN 13 08/16/2022 0758    CREATININE 0.89 08/16/2022 0758    GLU 77 08/16/2022 0758        Component Value Date/Time    CALCIUM 9.3 08/16/2022 0758    ALKPHOS 57 08/16/2022 0758    AST 17 08/16/2022 0758    ALT 27 08/16/2022 0758    BILITOT 0.5 08/16/2022 0758        Lab Results   Component Value Date    WBC 4.16 (L) 08/16/2022    RBC 4.17 (L) 08/16/2022    HGB 13.1 08/16/2022    MCV 94.5 08/16/2022    MCH 31.4 (H) 08/16/2022    MCHC 33.2 08/16/2022    RDW 13.4 08/16/2022     08/16/2022    MPV 10.5  08/16/2022    LYMPH 41.8 (H) 08/16/2022    LYMPH 1.74 08/16/2022    MONO 12.3 (H) 08/16/2022    EOS 0.07 08/16/2022    BASO 0.05 08/16/2022     No results found for this or any previous visit.      Anesthesia Plan  Type of Anesthesia, risks & benefits discussed:    Anesthesia Type: Gen ETT  Intra-op Monitoring Plan: Standard ASA Monitors  Post Op Pain Control Plan: multimodal analgesia  Induction:  IV  Airway Plan: Direct, Post-Induction  Informed Consent: Informed consent signed with the Patient and all parties understand the risks and agree with anesthesia plan.  All questions answered.   ASA Score: 2  Day of Surgery Review of History & Physical: I have interviewed and examined the patient. I have reviewed the patient's H&P dated: There are no significant changes.     Ready For Surgery From Anesthesia Perspective.     .

## 2022-08-22 NOTE — ANESTHESIA POSTPROCEDURE EVALUATION
Anesthesia Post Evaluation    Patient: Yolie Russell    Procedure(s) Performed: Procedure(s) (LRB):  XI ROBOTIC HYSTERECTOMY (N/A)  XI ROBOTIC SALPINGO-OOPHORECTOMY (Bilateral)  CYSTOSCOPY (N/A)    Final Anesthesia Type: general      Patient location during evaluation: PACU  Patient participation: Yes- Able to Participate  Level of consciousness: awake and alert and oriented  Post-procedure vital signs: reviewed and stable  Pain management: adequate  Airway patency: patent  FERN mitigation strategies: Multimodal analgesia  PONV status at discharge: No PONV  Anesthetic complications: no      Cardiovascular status: hemodynamically stable  Respiratory status: unassisted and spontaneous ventilation  Hydration status: euvolemic  Follow-up not needed.          Vitals Value Taken Time   BP 96/62 08/22/22 1101   Temp 36.8 °C (98.2 °F) 08/22/22 1025   Pulse 62 08/22/22 1110   Resp 15 08/22/22 1050   SpO2 97 % 08/22/22 1110   Vitals shown include unvalidated device data.      Event Time   Out of Recovery 10:50:00         Pain/Isaiah Score: Isaiah Score: 10 (8/22/2022 10:50 AM)

## 2022-08-23 VITALS
WEIGHT: 201.94 LBS | HEIGHT: 67 IN | OXYGEN SATURATION: 98 % | RESPIRATION RATE: 18 BRPM | SYSTOLIC BLOOD PRESSURE: 104 MMHG | TEMPERATURE: 98 F | DIASTOLIC BLOOD PRESSURE: 67 MMHG | BODY MASS INDEX: 31.7 KG/M2 | HEART RATE: 91 BPM

## 2022-08-23 DIAGNOSIS — G89.18 POSTOPERATIVE PAIN: ICD-10-CM

## 2022-08-23 DIAGNOSIS — E89.40 SURGICAL MENOPAUSE: Primary | ICD-10-CM

## 2022-08-23 LAB
BASOPHILS # BLD AUTO: 0.01 K/UL (ref 0–0.2)
BASOPHILS NFR BLD AUTO: 0.1 % (ref 0–1)
DIFFERENTIAL METHOD BLD: ABNORMAL
EOSINOPHIL # BLD AUTO: 0 K/UL (ref 0–0.5)
EOSINOPHIL NFR BLD AUTO: 0 % (ref 1–4)
ERYTHROCYTE [DISTWIDTH] IN BLOOD BY AUTOMATED COUNT: 13.7 % (ref 11.5–14.5)
ESTROGEN SERPL-MCNC: NORMAL PG/ML
HCT VFR BLD AUTO: 38.1 % (ref 38–47)
HGB BLD-MCNC: 13 G/DL (ref 12–16)
IMM GRANULOCYTES # BLD AUTO: 0.08 K/UL (ref 0–0.04)
IMM GRANULOCYTES NFR BLD: 0.7 % (ref 0–0.4)
INSULIN SERPL-ACNC: NORMAL U[IU]/ML
LAB AP GROSS DESCRIPTION: NORMAL
LAB AP LABORATORY NOTES: NORMAL
LYMPHOCYTES # BLD AUTO: 1.44 K/UL (ref 1–4.8)
LYMPHOCYTES NFR BLD AUTO: 12.6 % (ref 27–41)
MCH RBC QN AUTO: 31.5 PG (ref 27–31)
MCHC RBC AUTO-ENTMCNC: 34.1 G/DL (ref 32–36)
MCV RBC AUTO: 92.3 FL (ref 80–96)
MONOCYTES # BLD AUTO: 0.77 K/UL (ref 0–0.8)
MONOCYTES NFR BLD AUTO: 6.7 % (ref 2–6)
MPC BLD CALC-MCNC: 10.1 FL (ref 9.4–12.4)
NEUTROPHILS # BLD AUTO: 9.15 K/UL (ref 1.8–7.7)
NEUTROPHILS NFR BLD AUTO: 79.9 % (ref 53–65)
NRBC # BLD AUTO: 0 X10E3/UL
NRBC, AUTO (.00): 0 %
PLATELET # BLD AUTO: 277 K/UL (ref 150–400)
RBC # BLD AUTO: 4.13 M/UL (ref 4.2–5.4)
T3RU NFR SERPL: NORMAL %
WBC # BLD AUTO: 11.45 K/UL (ref 4.5–11)

## 2022-08-23 PROCEDURE — 85025 COMPLETE CBC W/AUTO DIFF WBC: CPT | Performed by: OBSTETRICS & GYNECOLOGY

## 2022-08-23 PROCEDURE — 36415 COLL VENOUS BLD VENIPUNCTURE: CPT | Performed by: OBSTETRICS & GYNECOLOGY

## 2022-08-23 PROCEDURE — 25000003 PHARM REV CODE 250: Performed by: OBSTETRICS & GYNECOLOGY

## 2022-08-23 RX ORDER — TRAMADOL HYDROCHLORIDE 50 MG/1
TABLET ORAL
Qty: 8 TABLET | Refills: 0 | Status: SHIPPED | OUTPATIENT
Start: 2022-08-23

## 2022-08-23 RX ORDER — ESTRADIOL 2 MG/1
2 TABLET ORAL DAILY
Qty: 90 TABLET | Refills: 3 | Status: SHIPPED | OUTPATIENT
Start: 2022-08-23 | End: 2023-04-19 | Stop reason: SDUPTHER

## 2022-08-23 RX ADMIN — FAMOTIDINE 20 MG: 20 TABLET ORAL at 09:08

## 2022-08-23 NOTE — PROGRESS NOTES
Alert this morning.    Has tolerated regular diet well this morning.      States she is ambulating to the bathroom voiding without difficulty.    Lungs are clear     abdomen soft bowel sounds present.  Dressings removed.    Postoperative hematocrit this morning 38.1%.  Hemoglobin 13.0.      Plan    Discussed ambulation in the room this morning.  May shower with assistance later this morning.  Plan on discharging home approximally 1:00 p.m..

## 2022-08-23 NOTE — DISCHARGE SUMMARY
Patient admitted for robotically assisted hysterectomy with bilateral salpingo-oophorectomy secondary to dysmenorrhea and menorrhagia.  See history and physical for details.    Hospital course    She was taken to surgery on 08/22/2022 where robotically assisted hysterectomy with bilateral salpingo-oophorectomy was performed.  See operative note for details.  No intraoperative complications.      Postoperatively Bledsoe catheter was removed.  Later in the evening she began ambulating to the bathroom and voiding without difficulty.  She tolerated full liquids well the evening of surgery.      The next morning patient alert no specific complaints.  Hematocrit stabilized at 38.1%.  INT line was removed.  She was progressed to regular diet which she tolerated well for both breakfast and lunch.    She was later allowed to shower.    On repeat examination proximally 1:30 p.m. she was ready for discharge home.  Lungs remain clear abdomen soft nondistended bowel sounds excellent.    We discussed discharge home.  Sedentary activity instructions were given.  Follow-up in my office in 2 weeks.    Instructed to return to the emergency room if any chills fever vomiting or heavy vaginal bleeding.      Discharge medications Ultram 50 mg 1 q.6 hours p.r.n. for pain 8. Given.  May continue to take ibuprofen twice daily over the next 3-4 days    Follow-up appointment in 2 weeks.

## 2022-08-23 NOTE — PLAN OF CARE
Problem: Adult Inpatient Plan of Care  Goal: Plan of Care Review  Outcome: Ongoing, Progressing  Flowsheets (Taken 8/22/2022 2313)  Plan of Care Reviewed With: patient  Goal: Patient-Specific Goal (Individualized)  Outcome: Ongoing, Progressing  Goal: Absence of Hospital-Acquired Illness or Injury  Outcome: Ongoing, Progressing  Goal: Optimal Comfort and Wellbeing  Outcome: Ongoing, Progressing  Goal: Readiness for Transition of Care  Outcome: Ongoing, Progressing     Problem: Infection  Goal: Absence of Infection Signs and Symptoms  Outcome: Ongoing, Progressing     Problem: Fall Injury Risk  Goal: Absence of Fall and Fall-Related Injury  Outcome: Ongoing, Progressing

## 2022-08-23 NOTE — NURSING
Discharge instructions/education provided. Pt verbalized understanding. Denies pain at this time.

## 2022-09-06 ENCOUNTER — OFFICE VISIT (OUTPATIENT)
Dept: OBSTETRICS AND GYNECOLOGY | Facility: CLINIC | Age: 42
End: 2022-09-06
Payer: COMMERCIAL

## 2022-09-06 VITALS — DIASTOLIC BLOOD PRESSURE: 74 MMHG | SYSTOLIC BLOOD PRESSURE: 114 MMHG

## 2022-09-06 DIAGNOSIS — Z09 POSTOP CHECK: Primary | ICD-10-CM

## 2022-09-06 PROCEDURE — 3074F PR MOST RECENT SYSTOLIC BLOOD PRESSURE < 130 MM HG: ICD-10-PCS | Mod: ,,, | Performed by: OBSTETRICS & GYNECOLOGY

## 2022-09-06 PROCEDURE — 3074F SYST BP LT 130 MM HG: CPT | Mod: ,,, | Performed by: OBSTETRICS & GYNECOLOGY

## 2022-09-06 PROCEDURE — 1159F PR MEDICATION LIST DOCUMENTED IN MEDICAL RECORD: ICD-10-PCS | Mod: ,,, | Performed by: OBSTETRICS & GYNECOLOGY

## 2022-09-06 PROCEDURE — 99024 POSTOP FOLLOW-UP VISIT: CPT | Mod: ,,, | Performed by: OBSTETRICS & GYNECOLOGY

## 2022-09-06 PROCEDURE — 99024 PR POST-OP FOLLOW-UP VISIT: ICD-10-PCS | Mod: ,,, | Performed by: OBSTETRICS & GYNECOLOGY

## 2022-09-06 PROCEDURE — 99213 OFFICE O/P EST LOW 20 MIN: CPT | Mod: PBBFAC | Performed by: OBSTETRICS & GYNECOLOGY

## 2022-09-06 PROCEDURE — 1159F MED LIST DOCD IN RCRD: CPT | Mod: ,,, | Performed by: OBSTETRICS & GYNECOLOGY

## 2022-09-06 PROCEDURE — 3078F DIAST BP <80 MM HG: CPT | Mod: ,,, | Performed by: OBSTETRICS & GYNECOLOGY

## 2022-09-06 PROCEDURE — 3078F PR MOST RECENT DIASTOLIC BLOOD PRESSURE < 80 MM HG: ICD-10-PCS | Mod: ,,, | Performed by: OBSTETRICS & GYNECOLOGY

## 2022-09-06 NOTE — PROGRESS NOTES
Subjective:       Patient ID: Yolie Russell is a 41 y.o. female.    Chief Complaint: Post-op Evaluation (2 weeks post RAH/BSO)    Presents 2 weeks postop.      Patient had robotic hysterectomy with BSO    Doing very well no specific complaints.    Taking Estrace 2 mg p.o. daily.    Review of Systems      Objective:      Physical Exam  Genitourinary:     Comments: External normal vault normal cuff well-healed bimanual exam unremarkable.      Assessment:       1. Postop check        Plan:       Patient Instructions   Discussed may gradually increase activity.    May drive when all incisional tenderness has resolved S     No sexual relations for another 6 weeks    Continue Estrace 2 mg p.o. daily.      Discuss obtaining mammography screening soon she understands to stop all hormones if any abnormal mammograms obtained    Follow-up with me in 6 weeks

## 2022-09-06 NOTE — PATIENT INSTRUCTIONS
Discussed may gradually increase activity.    May drive when all incisional tenderness has resolved S     No sexual relations for another 6 weeks    Continue Estrace 2 mg p.o. daily.      Discuss obtaining mammography screening soon she understands to stop all hormones if any abnormal mammograms obtained    Follow-up with me in 6 weeks

## 2022-10-18 ENCOUNTER — OFFICE VISIT (OUTPATIENT)
Dept: OBSTETRICS AND GYNECOLOGY | Facility: CLINIC | Age: 42
End: 2022-10-18
Payer: COMMERCIAL

## 2022-10-18 VITALS — SYSTOLIC BLOOD PRESSURE: 114 MMHG | DIASTOLIC BLOOD PRESSURE: 74 MMHG

## 2022-10-18 DIAGNOSIS — L92.9 GRANULATION TISSUE: Primary | ICD-10-CM

## 2022-10-18 PROCEDURE — 99024 PR POST-OP FOLLOW-UP VISIT: ICD-10-PCS | Mod: ,,, | Performed by: OBSTETRICS & GYNECOLOGY

## 2022-10-18 PROCEDURE — 99213 OFFICE O/P EST LOW 20 MIN: CPT | Mod: PBBFAC | Performed by: OBSTETRICS & GYNECOLOGY

## 2022-10-18 PROCEDURE — 3074F PR MOST RECENT SYSTOLIC BLOOD PRESSURE < 130 MM HG: ICD-10-PCS | Mod: ,,, | Performed by: OBSTETRICS & GYNECOLOGY

## 2022-10-18 PROCEDURE — 3078F PR MOST RECENT DIASTOLIC BLOOD PRESSURE < 80 MM HG: ICD-10-PCS | Mod: ,,, | Performed by: OBSTETRICS & GYNECOLOGY

## 2022-10-18 PROCEDURE — 99024 POSTOP FOLLOW-UP VISIT: CPT | Mod: ,,, | Performed by: OBSTETRICS & GYNECOLOGY

## 2022-10-18 PROCEDURE — 3078F DIAST BP <80 MM HG: CPT | Mod: ,,, | Performed by: OBSTETRICS & GYNECOLOGY

## 2022-10-18 PROCEDURE — 3074F SYST BP LT 130 MM HG: CPT | Mod: ,,, | Performed by: OBSTETRICS & GYNECOLOGY

## 2022-10-18 RX ORDER — SILVER NITRATE 38.21; 12.74 MG/1; MG/1
1 STICK TOPICAL ONCE
Status: SHIPPED | OUTPATIENT
Start: 2022-10-18

## 2022-10-18 NOTE — PATIENT INSTRUCTIONS
Discussed normal gyn exam except minute amount of granulation tissue.  Cauterized with silver nitrate.      Again discussed mammography screening patient to schedule soon.  She will notify me if she has not heard from report within 2 weeks.      Continue Estrace 2 mg p.o. daily.  Risks versus benefits of hormone replacement therapy again discussed    Follow-up in 6 months.

## 2022-10-18 NOTE — PROGRESS NOTES
Subjective:       Patient ID: Yolie Russell is a 42 y.o. female.    Chief Complaint: Post-op Evaluation (8 weeks post RAH/BSO)    Presents 8 weeks post robotic hysterectomy with BSO.  Presently taking Estrace 2 mg p.o. daily.  No hot flashes.      No specific complaints.    Review of Systems      Objective:      Physical Exam  Genitourinary:     Comments: External normal vault normal cuff well-healed however minute amount of granulation tissue noted at the vaginal cuff cauterized with silver nitrate.    Bimanual examination revealed uterus absent ovaries absent rectovaginal exam unremarkable.          Assessment:       1. Granulation tissue          Plan:       Patient Instructions   Discussed normal gyn exam except minute amount of granulation tissue.  Cauterized with silver nitrate.      Again discussed mammography screening patient to schedule soon.  She will notify me if she has not heard from report within 2 weeks.      Continue Estrace 2 mg p.o. daily.      Follow-up in 6 months.

## 2022-11-09 ENCOUNTER — TELEPHONE (OUTPATIENT)
Dept: OBSTETRICS AND GYNECOLOGY | Facility: CLINIC | Age: 42
End: 2022-11-09
Payer: COMMERCIAL

## 2022-11-09 NOTE — TELEPHONE ENCOUNTER
----- Message from Savana Butt sent at 11/8/2022  8:36 AM CST -----  Regarding: Gyn Prob  Vaginal dryness with irritation. Onset: after being diagnosed  with Covid.   Patient wants to speak with you.  Please call patient. [phone].

## 2022-12-07 ENCOUNTER — TELEPHONE (OUTPATIENT)
Dept: OBSTETRICS AND GYNECOLOGY | Facility: CLINIC | Age: 42
End: 2022-12-07
Payer: COMMERCIAL

## 2022-12-07 NOTE — TELEPHONE ENCOUNTER
----- Message from Savana Butt sent at 12/7/2022 11:12 AM CST -----  Regarding: Gyn Prob  Vaginal bleeding over the weekend.  Please call patient.  Contact number [phone].

## 2023-04-19 ENCOUNTER — OFFICE VISIT (OUTPATIENT)
Dept: OBSTETRICS AND GYNECOLOGY | Facility: CLINIC | Age: 43
End: 2023-04-19
Payer: COMMERCIAL

## 2023-04-19 VITALS
BODY MASS INDEX: 24.15 KG/M2 | WEIGHT: 154.19 LBS | SYSTOLIC BLOOD PRESSURE: 114 MMHG | DIASTOLIC BLOOD PRESSURE: 74 MMHG

## 2023-04-19 DIAGNOSIS — N95.2 ATROPHIC VAGINITIS: Primary | ICD-10-CM

## 2023-04-19 DIAGNOSIS — E89.40 SURGICAL MENOPAUSE: ICD-10-CM

## 2023-04-19 PROCEDURE — 3008F BODY MASS INDEX DOCD: CPT | Mod: ,,, | Performed by: OBSTETRICS & GYNECOLOGY

## 2023-04-19 PROCEDURE — 99212 PR OFFICE/OUTPT VISIT, EST, LEVL II, 10-19 MIN: ICD-10-PCS | Mod: S$PBB,,, | Performed by: OBSTETRICS & GYNECOLOGY

## 2023-04-19 PROCEDURE — 3074F PR MOST RECENT SYSTOLIC BLOOD PRESSURE < 130 MM HG: ICD-10-PCS | Mod: ,,, | Performed by: OBSTETRICS & GYNECOLOGY

## 2023-04-19 PROCEDURE — 1159F PR MEDICATION LIST DOCUMENTED IN MEDICAL RECORD: ICD-10-PCS | Mod: ,,, | Performed by: OBSTETRICS & GYNECOLOGY

## 2023-04-19 PROCEDURE — 3078F DIAST BP <80 MM HG: CPT | Mod: ,,, | Performed by: OBSTETRICS & GYNECOLOGY

## 2023-04-19 PROCEDURE — 99213 OFFICE O/P EST LOW 20 MIN: CPT | Mod: PBBFAC | Performed by: OBSTETRICS & GYNECOLOGY

## 2023-04-19 PROCEDURE — 3008F PR BODY MASS INDEX (BMI) DOCUMENTED: ICD-10-PCS | Mod: ,,, | Performed by: OBSTETRICS & GYNECOLOGY

## 2023-04-19 PROCEDURE — 1159F MED LIST DOCD IN RCRD: CPT | Mod: ,,, | Performed by: OBSTETRICS & GYNECOLOGY

## 2023-04-19 PROCEDURE — 3074F SYST BP LT 130 MM HG: CPT | Mod: ,,, | Performed by: OBSTETRICS & GYNECOLOGY

## 2023-04-19 PROCEDURE — 99212 OFFICE O/P EST SF 10 MIN: CPT | Mod: S$PBB,,, | Performed by: OBSTETRICS & GYNECOLOGY

## 2023-04-19 PROCEDURE — 3078F PR MOST RECENT DIASTOLIC BLOOD PRESSURE < 80 MM HG: ICD-10-PCS | Mod: ,,, | Performed by: OBSTETRICS & GYNECOLOGY

## 2023-04-19 RX ORDER — LINACLOTIDE 290 UG/1
CAPSULE, GELATIN COATED ORAL
COMMUNITY
Start: 2023-03-08

## 2023-04-19 RX ORDER — ESTRADIOL 0.1 MG/G
CREAM VAGINAL
Qty: 42.5 G | Refills: 1 | Status: SHIPPED | OUTPATIENT
Start: 2023-04-19

## 2023-04-19 RX ORDER — ESTRADIOL 2 MG/1
2 TABLET ORAL DAILY
Qty: 90 TABLET | Refills: 3 | Status: SHIPPED | OUTPATIENT
Start: 2023-04-19 | End: 2024-02-06 | Stop reason: SDUPTHER

## 2023-04-19 NOTE — PATIENT INSTRUCTIONS
Discussed normal pelvic examination.      Discussed Estrace vaginal cream 1/2 applicator full once or twice monthly as needed for vaginal dryness.      She will continue Estrace 2 mg p.o. daily.      Strongly recommended yearly mammography screening.      She understands to stop all hormones if any abnormal mammograms obtained    She will continue  colonoscopy screening as directed by Gastroenterology.    Continue routine glucose cholesterol testing with primary care provider.

## 2023-04-19 NOTE — PROGRESS NOTES
Subjective:       Patient ID: Yolie Russell is a 42 y.o. female.    Chief Complaint: Follow-up (Last seen in October, Here for 6 month follow up (had RAH/BSO))    Presents today six-month follow-up after robotic hysterectomy with BSO.      Taking Estrace 2 mg daily.  No hot flashes.      Discussed my recommendation for yearly mammography screening she understands to stop all hormones if any abnormal mammograms obtained.    Scheduling mammography pamphlet given.      Review of Systems      Objective:      Physical Exam  Genitourinary:     Comments: External normal vault normal cuff well-healed.  Slight dryness of the vaginal vault.  No further granulation tissue seen..      Bimanual examination revealed uterus absent ovaries absent.  No pelvic masses or tenderness noted.  Rectovaginal was unremarkable hemoccult not done she had colonoscopy screen approximally 1 year ago she will have follow-up as directed by Gastroenterology family history of colon cancer mother.  Recommended colonoscopy at least every 5 years      Assessment:       1. Atrophic vaginitis    2. Surgical menopause        Plan:       Patient Instructions   Discussed normal pelvic examination.      Discussed Estrace vaginal cream 1/2 applicator full once or twice monthly as needed for vaginal dryness.      She will continue Estrace 2 mg p.o. daily.      Strongly recommended yearly mammography screening.      She understands to stop all hormones if any abnormal mammograms obtained    She will continue  colonoscopy screening as directed by Gastroenterology.    Continue routine glucose cholesterol testing with primary care provider.

## 2023-05-10 ENCOUNTER — OFFICE VISIT (OUTPATIENT)
Dept: PRIMARY CARE CLINIC | Facility: CLINIC | Age: 43
End: 2023-05-10
Payer: OTHER MISCELLANEOUS

## 2023-05-10 VITALS
WEIGHT: 154 LBS | RESPIRATION RATE: 18 BRPM | HEART RATE: 67 BPM | TEMPERATURE: 98 F | SYSTOLIC BLOOD PRESSURE: 107 MMHG | OXYGEN SATURATION: 97 % | DIASTOLIC BLOOD PRESSURE: 67 MMHG | BODY MASS INDEX: 24.17 KG/M2 | HEIGHT: 67 IN
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
## 2023-05-10 LAB
ALT SERPL W P-5'-P-CCNC: 40 U/L (ref 13–56)
HBV SURFACE AB SER-ACNC: REACTIVE M[IU]/ML
HCV AB SER QL: NORMAL
HIV 1+O+2 AB SERPL QL: NORMAL
SYPHILIS AB INTERPRETATION: NORMAL

## 2023-05-10 PROCEDURE — 86803 HEPATITIS C AB TEST: CPT | Performed by: NURSE PRACTITIONER

## 2023-05-10 PROCEDURE — 99203 OFFICE O/P NEW LOW 30 MIN: CPT | Mod: ,,, | Performed by: NURSE PRACTITIONER

## 2023-05-10 PROCEDURE — 86706 HEP B SURFACE ANTIBODY: CPT | Performed by: NURSE PRACTITIONER

## 2023-05-10 PROCEDURE — 86780 TREPONEMA PALLIDUM: CPT | Performed by: NURSE PRACTITIONER

## 2023-05-10 PROCEDURE — 84460 ALANINE AMINO (ALT) (SGPT): CPT | Performed by: NURSE PRACTITIONER

## 2023-05-10 PROCEDURE — 36415 PR COLLECTION VENOUS BLOOD,VENIPUNCTURE: ICD-10-PCS | Mod: ,,, | Performed by: NURSE PRACTITIONER

## 2023-05-10 PROCEDURE — 99203 PR OFFICE/OUTPT VISIT, NEW, LEVL III, 30-44 MIN: ICD-10-PCS | Mod: ,,, | Performed by: NURSE PRACTITIONER

## 2023-05-10 PROCEDURE — 36415 COLL VENOUS BLD VENIPUNCTURE: CPT | Mod: ,,, | Performed by: NURSE PRACTITIONER

## 2023-05-10 PROCEDURE — 87389 HIV-1 AG W/HIV-1&-2 AB AG IA: CPT | Performed by: NURSE PRACTITIONER

## 2023-05-10 NOTE — PROGRESS NOTES
Subjective     Patient ID: Yolie Russell is a 42 y.o. female.    Chief Complaint: Work Related Injury (Patient presents here today with work related injury to right thumb (needle stick) that occurred 5/10/23. Patient states she was trying to retract a needle while coving the dressing when she stuck herself in the right thumb.)    43 y/o bf presents with needle stick to right thumb. 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     Review of Systems   Integumentary:  Positive for wound.   All other systems reviewed and are negative.       Objective     Physical Exam  Vitals and nursing note reviewed.   Constitutional:       Appearance: Normal appearance.   HENT:      Head: Normocephalic.   Eyes:      Pupils: Pupils are equal, round, and reactive to light.   Cardiovascular:      Rate and Rhythm: Normal rate and regular rhythm.      Heart sounds: Normal heart sounds.   Pulmonary:      Effort: Pulmonary effort is normal.      Breath sounds: Normal breath sounds.   Musculoskeletal:         General: Normal range of motion.      Cervical back: Normal range of motion.   Skin:     General: Skin is warm and dry.   Neurological:      General: No focal deficit present.      Mental Status: She is alert and oriented to person, place, and time.   Psychiatric:         Attention and Perception: Attention and perception normal.         Mood and Affect: Mood normal.         Speech: Speech normal.         Behavior: Behavior normal. Behavior is cooperative.         Cognition and Memory: Cognition normal.         Judgment: Judgment normal.          Assessment and Plan     Problem List Items Addressed This Visit          Other    Needle stick, hypodermic, accidental, initial encounter - Primary    Relevant Orders    ALT (SGPT) (Completed)    Hepatitis B Surface Ab, Qualitative    Hepatitis C Antibody    HIV 1/2 Ag/Ab (4th Gen)    Syphilis Antibody with reflex to RPR       RTW full  duty. RTN to [facility] 6/15/23 for 6 week lab work and follow up.

## 2023-05-11 PROCEDURE — 90471 TDAP VACCINE GREATER THAN OR EQUAL TO 7YO IM: ICD-10-PCS | Mod: ,,, | Performed by: NURSE PRACTITIONER

## 2023-05-11 PROCEDURE — 90715 TDAP VACCINE 7 YRS/> IM: CPT | Mod: ,,, | Performed by: NURSE PRACTITIONER

## 2023-05-11 PROCEDURE — 90715 TDAP VACCINE GREATER THAN OR EQUAL TO 7YO IM: ICD-10-PCS | Mod: ,,, | Performed by: NURSE PRACTITIONER

## 2023-05-11 PROCEDURE — 90471 IMMUNIZATION ADMIN: CPT | Mod: ,,, | Performed by: NURSE PRACTITIONER

## 2023-08-11 DIAGNOSIS — Z11.52 ENCOUNTER FOR SCREENING FOR COVID-19: Primary | ICD-10-CM

## 2023-08-11 LAB
CTP QC/QA: YES
SARS-COV-2 AG RESP QL IA.RAPID: NEGATIVE

## 2023-08-11 PROCEDURE — 87426 SARS CORONAVIRUS 2 ANTIGEN POCT: ICD-10-PCS | Mod: QW,,, | Performed by: NURSE PRACTITIONER

## 2023-08-11 PROCEDURE — 87426 SARSCOV CORONAVIRUS AG IA: CPT | Mod: QW,,, | Performed by: NURSE PRACTITIONER

## 2023-11-21 ENCOUNTER — HOSPITAL ENCOUNTER (OUTPATIENT)
Dept: RADIOLOGY | Facility: HOSPITAL | Age: 43
Discharge: HOME | End: 2023-11-21
Attending: FAMILY MEDICINE
Payer: COMMERCIAL

## 2023-11-21 ENCOUNTER — OFFICE VISIT (OUTPATIENT)
Dept: FAMILY MEDICINE | Facility: CLINIC | Age: 43
End: 2023-11-21
Payer: COMMERCIAL

## 2023-11-21 VITALS
TEMPERATURE: 98 F | WEIGHT: 162.38 LBS | SYSTOLIC BLOOD PRESSURE: 102 MMHG | BODY MASS INDEX: 25.48 KG/M2 | OXYGEN SATURATION: 100 % | HEIGHT: 67 IN | RESPIRATION RATE: 20 BRPM | DIASTOLIC BLOOD PRESSURE: 66 MMHG | HEART RATE: 86 BPM

## 2023-11-21 DIAGNOSIS — M79.605 LEFT LEG PAIN: ICD-10-CM

## 2023-11-21 DIAGNOSIS — M79.89 LEFT LEG SWELLING: ICD-10-CM

## 2023-11-21 DIAGNOSIS — M79.672 LEFT FOOT PAIN: ICD-10-CM

## 2023-11-21 DIAGNOSIS — L03.116 CELLULITIS OF LEFT LOWER EXTREMITY: ICD-10-CM

## 2023-11-21 DIAGNOSIS — M79.672 LEFT FOOT PAIN: Primary | ICD-10-CM

## 2023-11-21 PROCEDURE — 73630 X-RAY EXAM OF FOOT: CPT | Mod: TC,LT

## 2023-11-21 PROCEDURE — 3008F BODY MASS INDEX DOCD: CPT | Mod: ,,, | Performed by: FAMILY MEDICINE

## 2023-11-21 PROCEDURE — 3074F SYST BP LT 130 MM HG: CPT | Mod: ,,, | Performed by: FAMILY MEDICINE

## 2023-11-21 PROCEDURE — 3078F DIAST BP <80 MM HG: CPT | Mod: ,,, | Performed by: FAMILY MEDICINE

## 2023-11-21 PROCEDURE — 3074F PR MOST RECENT SYSTOLIC BLOOD PRESSURE < 130 MM HG: ICD-10-PCS | Mod: ,,, | Performed by: FAMILY MEDICINE

## 2023-11-21 PROCEDURE — 3078F PR MOST RECENT DIASTOLIC BLOOD PRESSURE < 80 MM HG: ICD-10-PCS | Mod: ,,, | Performed by: FAMILY MEDICINE

## 2023-11-21 PROCEDURE — 73590 X-RAY EXAM OF LOWER LEG: CPT | Mod: TC,LT

## 2023-11-21 PROCEDURE — 3008F PR BODY MASS INDEX (BMI) DOCUMENTED: ICD-10-PCS | Mod: ,,, | Performed by: FAMILY MEDICINE

## 2023-11-21 PROCEDURE — 99213 PR OFFICE/OUTPT VISIT, EST, LEVL III, 20-29 MIN: ICD-10-PCS | Mod: ,,, | Performed by: FAMILY MEDICINE

## 2023-11-21 PROCEDURE — 99213 OFFICE O/P EST LOW 20 MIN: CPT | Mod: ,,, | Performed by: FAMILY MEDICINE

## 2023-11-21 RX ORDER — SULFAMETHOXAZOLE AND TRIMETHOPRIM 800; 160 MG/1; MG/1
1 TABLET ORAL 2 TIMES DAILY
Qty: 20 TABLET | Refills: 0 | Status: SHIPPED | OUTPATIENT
Start: 2023-11-21 | End: 2023-12-01

## 2023-11-21 RX ORDER — TIZANIDINE 4 MG/1
4 TABLET ORAL 2 TIMES DAILY
COMMUNITY
Start: 2023-11-11

## 2023-11-21 RX ORDER — FLUCONAZOLE 150 MG/1
TABLET ORAL
Qty: 3 TABLET | Refills: 0 | Status: SHIPPED | OUTPATIENT
Start: 2023-11-21

## 2023-11-21 RX ORDER — IBUPROFEN 800 MG/1
800 TABLET ORAL 3 TIMES DAILY
COMMUNITY
Start: 2023-11-11

## 2023-11-21 NOTE — PROGRESS NOTES
Clinic Note    Patient Name: Yolie Russell  : 1980  MRN: [id number]    Chief Complaint   Patient presents with    Ankle Injury     LEFT ANKLE SWELLING WITH REDNESS AFTER A FALL       HPI:    Ms. Yolie Russell is a 43 y.o. female who presents to clinic today with CC of pain and swelling in L foot/ankle/lower leg since fall approximately 3 weeks ago. Reports it appears much improved but is still somewhat swollen, red, and painful. She did not go to ER at time of fall. Reports she is having a deck built/work done at her home. Reports she was carrying a 5 gallon bucket of pain and missed a step. Reports she fell and she believes the paint bucket landed on her leg. Reports a numb sensation to her lower leg/ankle which she states could be from swelling. Denies fever but reports redness.   Patient is, otherwise, without complaints.     Medications:  Medication List with Changes/Refills   New Medications    FLUCONAZOLE (DIFLUCAN) 150 MG TAB    Take one tablet by mouth every 72 hours X 3 doses if needed for yeast infection after completion of antibiotics.    SULFAMETHOXAZOLE-TRIMETHOPRIM 800-160MG (BACTRIM DS) 800-160 MG TAB    Take 1 tablet by mouth 2 (two) times daily. for 10 days   Current Medications    ERGOCALCIFEROL (ERGOCALCIFEROL) 50,000 UNIT CAP    Take 50,000 Units by mouth every 7 days.    ESTRADIOL (ESTRACE) 0.01 % (0.1 MG/GRAM) VAGINAL CREAM    1/2 applicatorful vaginally once or twice monthly as needed for vaginal dryness    ESTRADIOL (ESTRACE) 2 MG TABLET    Take 1 tablet (2 mg total) by mouth once daily.    ETONOGESTREL-ETHINYL ESTRADIOL (NUVARING) 0.12-0.015 MG/24 HR VAGINAL RING    insert 1 ring VAGINALLY AND LEAVE in continuously FOR 3 WEEKS, THEN REMOVE FOR 1 WEEK (THEN REPEAT with new ring)    IBUPROFEN (ADVIL,MOTRIN) 800 MG TABLET    Take 800 mg by mouth 3 (three) times daily.    LINZESS 290 MCG CAP CAPSULE    TAKE ONE CAPSULE BY MOUTH DAILY atleast 30 minutes BEFORE THE first meal of THE DAY ON an  EMPTY stomach    METHOCARBAMOL (ROBAXIN) 750 MG TAB    Take 750 mg by mouth nightly as needed.    ONDANSETRON (ZOFRAN) 8 MG TABLET    Take 8 mg by mouth every 8 (eight) hours.    PHENAZOPYRIDINE (PYRIDIUM) 200 MG TABLET    Take 200 mg by mouth once.    PHENTERMINE (ADIPEX-P) 37.5 MG TABLET    Take 37.5 mg by mouth once daily.    TIZANIDINE (ZANAFLEX) 4 MG TABLET    Take 4 mg by mouth 2 (two) times daily.    TRAMADOL (ULTRAM) 50 MG TABLET    1 tablet every 6 hours p.r.n. for pain        Allergies: Patient has no known allergies.      Past Medical History:    No past medical history on file.    Past Surgical History:    Past Surgical History:   Procedure Laterality Date    DILATION AND CURETTAGE OF UTERUS      ROBOT-ASSISTED LAPAROSCOPIC ABDOMINAL HYSTERECTOMY USING DA KAYLA XI N/A 8/22/2022    Procedure: XI ROBOTIC HYSTERECTOMY;  Surgeon: Gigi Morris MD;  Location: [facility];  Service: OB/GYN;  Laterality: N/A;    ROBOT-ASSISTED LAPAROSCOPIC SALPINGO-OOPHORECTOMY USING DA KAYLA XI Bilateral 8/22/2022    Procedure: XI ROBOTIC SALPINGO-OOPHORECTOMY;  Surgeon: Gigi Morris MD;  Location: [facility];  Service: OB/GYN;  Laterality: Bilateral;         Social History:    Social History     Tobacco Use   Smoking Status Never   Smokeless Tobacco Never     Social History     Substance and Sexual Activity   Alcohol Use Yes    Comment: rare occassions     Social History     Substance and Sexual Activity   Drug Use Never         Family History:    Family History   Problem Relation Age of Onset    COPD Father     Colon cancer Mother     Diabetes Mother     Thyroid disease Sister     Kidney disease Sister        Review of Systems:    Review of Systems   Constitutional:  Negative for appetite change, chills, fatigue, fever and unexpected weight change.   Eyes:  Negative for visual disturbance.   Respiratory:  Negative for cough and shortness of breath.    Cardiovascular:  Negative for chest pain.  "  Gastrointestinal:  Negative for abdominal pain, change in bowel habit, constipation, diarrhea, nausea and vomiting.   Musculoskeletal:  Positive for arthralgias.   Integumentary:  Negative for rash.   Neurological:  Negative for dizziness and headaches.   Psychiatric/Behavioral:  The patient is not nervous/anxious.         Vitals:    Vitals:    11/21/23 1445   BP: 102/66   BP Location: Left arm   Patient Position: Sitting   BP Method: Large (Manual)   Pulse: 86   Resp: 20   Temp: 98 °F (36.7 °C)   TempSrc: Oral   SpO2: 100%   Weight: 73.7 kg (162 lb 6.4 oz)   Height: 5' 7" (1.702 m)       Body mass index is 25.44 kg/m².    Wt Readings from Last 3 Encounters:   11/21/23 1445 73.7 kg (162 lb 6.4 oz)   05/10/23 1206 69.9 kg (154 lb)   04/19/23 1126 69.9 kg (154 lb 3.2 oz)        Physical Exam:    Physical Exam  Constitutional:       General: She is not in acute distress.     Appearance: Normal appearance.   HENT:      Nose: Nose normal.      Mouth/Throat:      Mouth: Mucous membranes are moist.      Pharynx: Oropharynx is clear.   Eyes:      Conjunctiva/sclera: Conjunctivae normal.   Cardiovascular:      Rate and Rhythm: Normal rate and regular rhythm.      Heart sounds: Normal heart sounds. No murmur heard.  Pulmonary:      Effort: Pulmonary effort is normal. No respiratory distress.      Breath sounds: Normal breath sounds. No wheezing, rhonchi or rales.   Abdominal:      General: Bowel sounds are normal.      Palpations: Abdomen is soft.      Tenderness: There is no abdominal tenderness.   Musculoskeletal:         General: Swelling and tenderness present. Normal range of motion.      Cervical back: Neck supple.      Comments: + swelling of L ankle/lower leg   + TTP   Skin:     Findings: No rash.      Comments: + erythema/hyperpigmentation/bruising to LLE   Neurological:      General: No focal deficit present.      Mental Status: She is alert. Mental status is at baseline.   Psychiatric:         Mood and Affect: " Mood normal.         Assessment/Plan:   1. Left foot pain  -     X-Ray Foot Complete 3 view Left; Future; Expected date: 11/21/2023    2. Left leg pain  -     X-Ray Tibia Fibula 2 View Left; Future; Expected date: 11/21/2023  -     US Lower Extremity Veins Left; Future; Expected date: 11/21/2023    3. Left leg swelling  -     US Lower Extremity Veins Left; Future; Expected date: 11/21/2023    4. Cellulitis of left lower extremity  -     sulfamethoxazole-trimethoprim 800-160mg (BACTRIM DS) 800-160 mg Tab; Take 1 tablet by mouth 2 (two) times daily. for 10 days  Dispense: 20 tablet; Refill: 0  -     fluconazole (DIFLUCAN) 150 MG Tab; Take one tablet by mouth every 72 hours X 3 doses if needed for yeast infection after completion of antibiotics.  Dispense: 3 tablet; Refill: 0         Active Problem List with Overview Notes    Diagnosis Date Noted    Needle stick, hypodermic, accidental, initial encounter 05/10/2023    Right knee pain 05/18/2022          RTC prn if symptoms worsen or fail to resolve.  Patient voiced understanding and is agreeable to plan.      Tanisha Krishnan MD    Family Medicine

## 2023-12-01 ENCOUNTER — TELEPHONE (OUTPATIENT)
Dept: FAMILY MEDICINE | Facility: CLINIC | Age: 43
End: 2023-12-01
Payer: COMMERCIAL

## 2023-12-01 NOTE — TELEPHONE ENCOUNTER
Contacted patient in regards to x-ray. Informed patient that her x-ray shows no sign of a fracture but she does have a mild case of arthritis. Patient voiced understanding and had no further questions.     ----- Message from Vanna Mccallum LPN sent at 11/29/2023 11:37 AM CST -----    ----- Message -----  From: Chloé Krishnan MD  Sent: 11/21/2023   5:29 PM CST  To: Vanna Mccallum LPN    Please call patient to let her know her XR does not reveal fracture but some mild degenerative changes/arthritis. Thanks!

## 2023-12-01 NOTE — TELEPHONE ENCOUNTER
Contacted patient in regards to x-ray. Informed patient that she does have a fracture but she does have a mild case of arthritis. Patient voiced understanding and had no further questions.     ----- Message from Vanna Mccallum LPN sent at 11/29/2023 11:37 AM CST -----    ----- Message -----  From: Chloé Krishnan MD  Sent: 11/21/2023   5:29 PM CST  To: Vanna Mccallum LPN    Please call patient to let her know her XR does not reveal fracture but some mild degenerative changes/arthritis. Thanks!

## 2023-12-14 ENCOUNTER — HOSPITAL ENCOUNTER (OUTPATIENT)
Dept: RADIOLOGY | Facility: HOSPITAL | Age: 43
Discharge: HOME | End: 2023-12-14
Attending: FAMILY MEDICINE
Payer: COMMERCIAL

## 2023-12-14 DIAGNOSIS — M79.89 LEFT LEG SWELLING: ICD-10-CM

## 2023-12-14 DIAGNOSIS — M79.605 LEFT LEG PAIN: ICD-10-CM

## 2023-12-14 PROCEDURE — 93971 EXTREMITY STUDY: CPT | Mod: TC,LT

## 2023-12-15 ENCOUNTER — TELEPHONE (OUTPATIENT)
Dept: FAMILY MEDICINE | Facility: CLINIC | Age: 43
End: 2023-12-15
Payer: COMMERCIAL

## 2023-12-15 NOTE — TELEPHONE ENCOUNTER
----- Message from Chloé Krishnan MD sent at 12/14/2023  4:35 PM CST -----  Please call patient to let her know that she does not have a blood clot. She does have a small hematoma likely from recent fall which should eventually reabsorb on its own. This may take several months. Thanks!      3455- call made to pt. Pt voiced understanding.

## 2024-02-06 ENCOUNTER — OFFICE VISIT (OUTPATIENT)
Dept: OBSTETRICS AND GYNECOLOGY | Facility: CLINIC | Age: 44
End: 2024-02-06
Payer: COMMERCIAL

## 2024-02-06 VITALS
WEIGHT: 176 LBS | DIASTOLIC BLOOD PRESSURE: 60 MMHG | HEIGHT: 67 IN | BODY MASS INDEX: 27.62 KG/M2 | SYSTOLIC BLOOD PRESSURE: 118 MMHG

## 2024-02-06 DIAGNOSIS — Z01.419 WOMEN'S ANNUAL ROUTINE GYNECOLOGICAL EXAMINATION: Primary | ICD-10-CM

## 2024-02-06 DIAGNOSIS — E89.40 SURGICAL MENOPAUSE: ICD-10-CM

## 2024-02-06 DIAGNOSIS — Z13.228 ENCOUNTER FOR SCREENING FOR METABOLIC DISORDER: ICD-10-CM

## 2024-02-06 DIAGNOSIS — Z11.3 SCREEN FOR STD (SEXUALLY TRANSMITTED DISEASE): ICD-10-CM

## 2024-02-06 LAB
CANDIDA SPECIES: NEGATIVE
GARDNERELLA: POSITIVE
TRICHOMONAS: NEGATIVE

## 2024-02-06 PROCEDURE — 87660 TRICHOMONAS VAGIN DIR PROBE: CPT | Mod: ,,, | Performed by: CLINICAL MEDICAL LABORATORY

## 2024-02-06 PROCEDURE — 87510 GARDNER VAG DNA DIR PROBE: CPT | Mod: ,,, | Performed by: CLINICAL MEDICAL LABORATORY

## 2024-02-06 PROCEDURE — 87491 CHLMYD TRACH DNA AMP PROBE: CPT | Mod: ,,, | Performed by: CLINICAL MEDICAL LABORATORY

## 2024-02-06 PROCEDURE — 1159F MED LIST DOCD IN RCRD: CPT | Mod: ,,, | Performed by: OBSTETRICS & GYNECOLOGY

## 2024-02-06 PROCEDURE — 87480 CANDIDA DNA DIR PROBE: CPT | Mod: ,,, | Performed by: CLINICAL MEDICAL LABORATORY

## 2024-02-06 PROCEDURE — 87591 N.GONORRHOEAE DNA AMP PROB: CPT | Mod: ,,, | Performed by: CLINICAL MEDICAL LABORATORY

## 2024-02-06 PROCEDURE — 99396 PREV VISIT EST AGE 40-64: CPT | Mod: S$PBB,,, | Performed by: OBSTETRICS & GYNECOLOGY

## 2024-02-06 PROCEDURE — 99214 OFFICE O/P EST MOD 30 MIN: CPT | Mod: PBBFAC | Performed by: OBSTETRICS & GYNECOLOGY

## 2024-02-06 PROCEDURE — 3008F BODY MASS INDEX DOCD: CPT | Mod: ,,, | Performed by: OBSTETRICS & GYNECOLOGY

## 2024-02-06 PROCEDURE — 3078F DIAST BP <80 MM HG: CPT | Mod: ,,, | Performed by: OBSTETRICS & GYNECOLOGY

## 2024-02-06 PROCEDURE — 3074F SYST BP LT 130 MM HG: CPT | Mod: ,,, | Performed by: OBSTETRICS & GYNECOLOGY

## 2024-02-06 RX ORDER — ESTRADIOL 2 MG/1
2 TABLET ORAL DAILY
Qty: 90 TABLET | Refills: 3 | Status: SHIPPED | OUTPATIENT
Start: 2024-02-06 | End: 2025-02-05

## 2024-02-06 NOTE — PATIENT INSTRUCTIONS
Discussed restarting Estrace 2 mg p.o. daily.    Glucose cholesterol screening ordered    HIV syphilis ordered at patient's request.      Patient self schedule mammography screening.      Extra lubrication during sexual relations discussed.      Discussed colonoscopy screening every 5 years because of family history of colon cancer.  She will continue yearly gyn checks.

## 2024-02-06 NOTE — PROGRESS NOTES
Subjective:       Patient ID: Yolie Russell is a 43 y.o. female.    Chief Complaint: Well Woman (Here for check up. Had a fall some months back and has a bruise lower left leg. )    Presents for yearly gyn check    No specific gyn complaints sent for noted hot flashes.    She has had previous hysterectomy with BSO.    Presently using Estrace 2 mg p.o. daily.  However after further discussion later in the office it is noted she has not recently taking her Estrace.      She was concerned about risk factors.  Potential risk of blood clots or stroke discussed she understands she should have yearly mammography screening stop all hormones if any abnormal mammograms obtained      Review of Systems      Objective:      Physical Exam  Exam conducted with a chaperone present.   HENT:      Head: Normocephalic.      Nose: Nose normal.   Eyes:      Pupils: Pupils are equal, round, and reactive to light.   Cardiovascular:      Rate and Rhythm: Normal rate.   Pulmonary:      Effort: Pulmonary effort is normal.      Breath sounds: Normal breath sounds.   Chest:      Comments: Breasts without palpable masses no skin retraction or nipple dimpling.  Strongly recommended yearly mammography screening.  Scheduling pamphlet given.  She will self schedule.  Abdominal:      General: Abdomen is flat.      Palpations: Abdomen is soft.   Genitourinary:     General: Normal vulva.      Vagina: Normal.      Cervix: Normal.      Uterus: Normal.       Adnexa: Right adnexa normal and left adnexa normal.      Rectum: Normal.      Comments: External genitalia normal to appearance.  Speculum exam revealed vaginal cuff well-healed.  She desires STD testing.  GC and chlamydia and bacterial vaginosis probe taken.  However no abnormal discharge noted.  Bimanual exam revealed uterus and ovaries absent rectovaginal exam unremarkable hemoccult was negative family history of colon cancer previous colonoscopy 2020.  Discussed continuing colonoscopy screening  every 5 years   Musculoskeletal:         General: Normal range of motion.      Cervical back: Full passive range of motion without pain, normal range of motion and neck supple.   Skin:     General: Skin is dry.   Neurological:      General: No focal deficit present.      Mental Status: She is alert.   Psychiatric:         Attention and Perception: Attention normal.         Mood and Affect: Mood normal.         Assessment:       1. Women's annual routine gynecological examination    2. Screen for STD (sexually transmitted disease)    3. Encounter for screening for metabolic disorder    4. Surgical menopause        Plan:       Patient Instructions   Discussed restarting Estrace 2 mg p.o. daily.    Glucose cholesterol screening ordered    HIV syphilis ordered at patient's request.      Patient self schedule mammography screening.      Extra lubrication during sexual relations discussed.      Discussed colonoscopy screening every 5 years because of family history of colon cancer.  She will continue yearly gyn checks.

## 2024-02-07 ENCOUNTER — PATIENT MESSAGE (OUTPATIENT)
Dept: OBSTETRICS AND GYNECOLOGY | Facility: CLINIC | Age: 44
End: 2024-02-07
Payer: COMMERCIAL

## 2024-02-07 LAB
CHLAMYDIA BY PCR: NEGATIVE
N. GONORRHOEAE (GC) BY PCR: NEGATIVE

## 2024-04-11 ENCOUNTER — TELEPHONE (OUTPATIENT)
Dept: OBSTETRICS AND GYNECOLOGY | Facility: CLINIC | Age: 44
End: 2024-04-11
Payer: COMMERCIAL

## 2024-04-11 NOTE — TELEPHONE ENCOUNTER
----- Message from Cecy Kilpatrick sent at 2024  1:52 PM CDT -----  Pt wants to be seen next week for vag dryness/itching. There is an opening on Thurs but she can't come that day. Call [phone]      Verified ; Informed we can see her Tuesday at 1330; patient was happy for that and agreed with date and time.

## 2024-04-16 ENCOUNTER — OFFICE VISIT (OUTPATIENT)
Dept: OBSTETRICS AND GYNECOLOGY | Facility: CLINIC | Age: 44
End: 2024-04-16
Payer: COMMERCIAL

## 2024-04-16 VITALS — SYSTOLIC BLOOD PRESSURE: 120 MMHG | DIASTOLIC BLOOD PRESSURE: 72 MMHG

## 2024-04-16 DIAGNOSIS — Z01.419 VISIT FOR PELVIC EXAM: Primary | ICD-10-CM

## 2024-04-16 PROCEDURE — 3074F SYST BP LT 130 MM HG: CPT | Mod: ,,, | Performed by: OBSTETRICS & GYNECOLOGY

## 2024-04-16 PROCEDURE — 1159F MED LIST DOCD IN RCRD: CPT | Mod: ,,, | Performed by: OBSTETRICS & GYNECOLOGY

## 2024-04-16 PROCEDURE — 3078F DIAST BP <80 MM HG: CPT | Mod: ,,, | Performed by: OBSTETRICS & GYNECOLOGY

## 2024-04-16 PROCEDURE — 99212 OFFICE O/P EST SF 10 MIN: CPT | Mod: S$PBB,,, | Performed by: OBSTETRICS & GYNECOLOGY

## 2024-04-16 PROCEDURE — 99214 OFFICE O/P EST MOD 30 MIN: CPT | Mod: PBBFAC | Performed by: OBSTETRICS & GYNECOLOGY

## 2024-04-16 NOTE — PATIENT INSTRUCTIONS
Discussed essentially normal gyn examination I do not see any evidence of vaginitis.      I have encouraged extra lubrication during sexual relations.    She will continue Estrace vaginal cream.      Continue yearly mammography screenings.    Follow-up yearly or p.r.n. if problems arise

## 2024-04-16 NOTE — PROGRESS NOTES
Subjective:       Patient ID: Yolie Russell is a 43 y.o. female.    Chief Complaint: Vaginal Atrophy (Pt presents c/o increase vaginal dryness/irritation. )    Presents for problem visit.      Patient has occasional vaginal dryness.      In February re restarted her oral Estrace which she has been taking 2 mg daily.    Interestingly no dyspareunia or dryness during intercourse.  She noticed dryness intermittently in the vaginal opening.    Review of Systems      Objective:      Physical Exam  Genitourinary:     Comments: External normal vault normal normal-appearing mucus.  Cervix absent vaginal cuff well-healed    Bimanual exam including rectovaginal exam was unremarkable.              Assessment:       1. Visit for pelvic exam        Plan:       Patient Instructions   Discussed essentially normal gyn examination I do not see any evidence of vaginitis.      I have encouraged extra lubrication during sexual relations.    She will continue Estrace vaginal cream.      Continue yearly mammography screenings.    Follow-up yearly or p.r.n. if problems arise

## 2024-05-07 DIAGNOSIS — L03.116 CELLULITIS OF LEFT LOWER EXTREMITY: ICD-10-CM

## 2024-05-07 RX ORDER — SULFAMETHOXAZOLE AND TRIMETHOPRIM 800; 160 MG/1; MG/1
1 TABLET ORAL 2 TIMES DAILY
Qty: 14 TABLET | Refills: 1 | Status: SHIPPED | OUTPATIENT
Start: 2024-05-07 | End: 2024-05-14

## 2024-05-07 RX ORDER — METHYLPREDNISOLONE 4 MG/1
TABLET ORAL
Qty: 21 EACH | Refills: 1 | Status: SHIPPED | OUTPATIENT
Start: 2024-05-07 | End: 2024-05-28

## 2024-05-07 RX ORDER — FLUCONAZOLE 200 MG/1
200 TABLET ORAL DAILY
Qty: 7 TABLET | Refills: 0 | Status: SHIPPED | OUTPATIENT
Start: 2024-05-07 | End: 2024-05-14

## 2024-05-07 RX ORDER — FLUCONAZOLE 150 MG/1
TABLET ORAL
Qty: 3 TABLET | Refills: 0 | Status: SHIPPED | OUTPATIENT
Start: 2024-05-07

## 2024-06-11 ENCOUNTER — PATIENT MESSAGE (OUTPATIENT)
Dept: INTERNAL MEDICINE | Facility: CLINIC | Age: 44
End: 2024-06-11

## 2024-06-11 ENCOUNTER — OFFICE VISIT (OUTPATIENT)
Dept: OBSTETRICS AND GYNECOLOGY | Facility: CLINIC | Age: 44
End: 2024-06-11
Payer: COMMERCIAL

## 2024-06-11 ENCOUNTER — OFFICE VISIT (OUTPATIENT)
Dept: INTERNAL MEDICINE | Facility: CLINIC | Age: 44
End: 2024-06-11
Payer: COMMERCIAL

## 2024-06-11 VITALS — SYSTOLIC BLOOD PRESSURE: 118 MMHG | DIASTOLIC BLOOD PRESSURE: 72 MMHG

## 2024-06-11 DIAGNOSIS — N90.89 LESION OF LABIA: Primary | ICD-10-CM

## 2024-06-11 DIAGNOSIS — E66.9 OBESITY, UNSPECIFIED CLASSIFICATION, UNSPECIFIED OBESITY TYPE, UNSPECIFIED WHETHER SERIOUS COMORBIDITY PRESENT: Primary | ICD-10-CM

## 2024-06-11 PROCEDURE — 99213 OFFICE O/P EST LOW 20 MIN: CPT | Mod: S$PBB,,, | Performed by: OBSTETRICS & GYNECOLOGY

## 2024-06-11 PROCEDURE — 3074F SYST BP LT 130 MM HG: CPT | Mod: ,,, | Performed by: OBSTETRICS & GYNECOLOGY

## 2024-06-11 PROCEDURE — 99213 OFFICE O/P EST LOW 20 MIN: CPT | Mod: PBBFAC | Performed by: OBSTETRICS & GYNECOLOGY

## 2024-06-11 PROCEDURE — 3078F DIAST BP <80 MM HG: CPT | Mod: ,,, | Performed by: OBSTETRICS & GYNECOLOGY

## 2024-06-11 PROCEDURE — 99999 PR PBB SHADOW E&M-EST. PATIENT-LVL III: CPT | Mod: PBBFAC,,, | Performed by: OBSTETRICS & GYNECOLOGY

## 2024-06-11 PROCEDURE — 87529 HSV DNA AMP PROBE: CPT | Mod: 90,,, | Performed by: CLINICAL MEDICAL LABORATORY

## 2024-06-11 PROCEDURE — 99499 UNLISTED E&M SERVICE: CPT | Mod: 95,,,

## 2024-06-11 RX ORDER — SEMAGLUTIDE 0.5 MG/.5ML
0.5 INJECTION, SOLUTION SUBCUTANEOUS
Qty: 2 ML | Refills: 0 | Status: ACTIVE | OUTPATIENT
Start: 2024-07-11

## 2024-06-11 RX ORDER — SEMAGLUTIDE 0.25 MG/.5ML
0.25 INJECTION, SOLUTION SUBCUTANEOUS
Qty: 2 ML | Refills: 0 | Status: ACTIVE | OUTPATIENT
Start: 2024-06-11

## 2024-06-11 RX ORDER — SEMAGLUTIDE 1 MG/.5ML
1 INJECTION, SOLUTION SUBCUTANEOUS
Qty: 2 ML | Refills: 0 | Status: ACTIVE | OUTPATIENT
Start: 2024-06-11

## 2024-06-11 RX ORDER — VALACYCLOVIR HYDROCHLORIDE 1 G/1
1000 TABLET, FILM COATED ORAL 2 TIMES DAILY
Qty: 20 TABLET | Refills: 1 | Status: SHIPPED | OUTPATIENT
Start: 2024-06-11 | End: 2025-06-11

## 2024-06-11 NOTE — PROGRESS NOTES
Patient ID: Yolie Russell is a 43 y.o. female    Subjective  Chief Complaint: patient presents for medical weight loss management.    Contraindications to GLP-1 receptor agonist therapy:   No personal or family history of MEN or MTC, history of allergic reaction while taking a GLP-1 receptor agonist, and history of pancreatitis while taking a GLP-1 receptor agonist     History of weight loss therapy:  Reports has previously tried Adipex and samples of Ozempic    Weight loss history:  Starting weight:    6/7/2024   Recent Readings    Weight (lbs) 198 lb    BMI 31.95 BMI        Objective  Lab Results   Component Value Date     08/16/2022     Lab Results   Component Value Date    K 4.4 08/16/2022     Lab Results   Component Value Date     08/16/2022     Lab Results   Component Value Date    CO2 24 08/16/2022     Lab Results   Component Value Date    BUN 13 08/16/2022     Lab Results   Component Value Date    CALCIUM 9.3 08/16/2022     Lab Results   Component Value Date    PROT 7.3 08/16/2022     Lab Results   Component Value Date    ALBUMIN 4.1 08/16/2022     Lab Results   Component Value Date    BILITOT 0.5 08/16/2022     Lab Results   Component Value Date    AST 17 08/16/2022     Lab Results   Component Value Date    ALT 40 05/10/2023    ALT 27 08/16/2022     Lab Results   Component Value Date    ANIONGAP 13 08/16/2022     Lab Results   Component Value Date    CREATININE 0.89 08/16/2022     Lab Results   Component Value Date    EGFRNORACEVR 84 08/16/2022         Assessment/Plan  -Pt qualifies for GLP-1 therapy based on BMI greater than or equal to 30 kg/m2  -Initiate Wegovy 0.25 mg once weekly for 1 month  -Then increase to Wegovy 0.5 mg once weekly for 1 month  -Then increase to Wegovy 1 mg once weekly  -RTC in 3 months       Patient consented to pharmacist management via collaborative practice.

## 2024-06-11 NOTE — PATIENT INSTRUCTIONS
Discussed beginning course of Valtrex 1 g b.i.d. for 10 days.      We will follow-up herpes culture    Will also draw antibody profile

## 2024-06-11 NOTE — PATIENT INSTRUCTIONS
WEGOVY® (wee-EDWINA-vee), (semaglutide) injection, for subcutaneous use  Read this Medication Guide and Instructions for Use before you start using WEGOVY and each time you get a refill. There may be new information. This information does not take the place of talking to your healthcare provider about your medical condition or your treatment.  What is the most important information I should know about WEGOVY?   WEGOVY may cause serious side effects, including:   Possible thyroid tumors, including cancer. Tell your healthcare provider if you get a lump or swelling in your neck, hoarseness, trouble swallowing, or shortness of breath. These may be symptoms of thyroid cancer. In studies with rodents, WEGOVY and medicines that work like WEGOVY caused thyroid tumors, including thyroid cancer. It is not known if WEGOVY will cause thyroid tumors or a type of thyroid cancer called medullary thyroid carcinoma (MTC) in people.   Do not use WEGOVY if you or any of your family have ever had a type of thyroid cancer called medullary thyroid carcinoma (MTC), or if you have an endocrine system condition called Multiple Endocrine Neoplasia syndrome type 2 (MEN 2).  What is WEGOVY?   WEGOVY is an injectable prescription medicine used with a reduced calorie diet and increased physical activity:   to reduce the risk of major cardiovascular events such as death, heart attack, or stroke in adults with known heart disease and with either obesity or overweight.   that may help adults and children aged 12 years and older with obesity, or some adults with overweight who also have weight-related medical problems, to help them lose excess body weight and keep the weight off.   WEGOVY contains semaglutide and should not be used with other semaglutide-containing products or other GLP-1 receptor agonist medicines.  It is not known if WEGOVY is safe and effective for use in children under 12 years of age.  Do not use WEGOVY if:   you or any of your  family have ever had a type of thyroid cancer called medullary thyroid carcinoma (MTC) or if you have an endocrine system condition called Multiple Endocrine Neoplasia syndrome type 2 (MEN 2).   you have had a serious allergic reaction to semaglutide or any of the ingredients in WEGOVY. See the end of this Medication Guide for a complete list of ingredients in WEGOVY. Symptoms of a serious allergic reaction include:   swelling of your face, lips, tongue or throat   fainting or feeling dizzy   problems breathing or swallowing   very rapid heartbeat   severe rash or itching  Before using WEGOVY, tell your healthcare provider if you have any other medical conditions, including if you:   have or have had problems with your pancreas or kidneys.   have type 2 diabetes and a history of diabetic retinopathy.   have or have had depression or suicidal thoughts, or mental health issues.   are pregnant or plan to become pregnant. WEGOVY may harm your unborn baby. You should stop using WEGOVY 2 months before you plan to become pregnant.   Pregnancy Exposure Registry: There is a pregnancy exposure registry for women who use WEGOVY during pregnancy. The purpose of this registry is to collect information about the health of you and your baby. Talk to your healthcare provider about how you can take part in this registry or you may contact [employer] at 1-980.260.3400.   are breastfeeding or plan to breastfeed. It is not known if WEGOVY passes into your breast milk. You should talk with your healthcare provider about the best way to feed your baby while using WEGOVY.   Tell your healthcare provider about all the medicines you take, including prescription and over-the-counter medicines, vitamins, and herbal supplements. WEGOVY may affect the way some medicines work and some medicines may affect the way WEGOVY works. Tell your healthcare provider if you are taking other medicines to treat diabetes, including sulfonylureas or insulin.  WEGOVY slows stomach emptying and can affect medicines that need to pass through the stomach quickly.   Know the medicines you take. Keep a list of them to show your healthcare provider and pharmacist when you get a new medicine.  How should I use WEGOVY?   Read the Instructions for Use that comes with WEGOVY.   Use WEGOVY exactly as your healthcare provider tells you to.   Your healthcare provider should show you how to use WEGOVY before you use it for the first time.   WEGOVY is injected under the skin (subcutaneously) of your stomach (abdomen), thigh, or upper arm. Do not inject WEGOVY into a muscle (intramuscularly) or vein (intravenously).   Change (rotate) your injection site with each injection. Do not use the same site for each injection.   Use WEGOVY 1 time each week, on the same day each week, at any time of the day.   Start WEGOVY with 0.25 mg per week in your first month. In your second month, increase your weekly dose to 0.5 mg. In the third month, increase your weekly dose to 1 mg. In the fourth month, increase your weekly dose to 1.7 mg. In the fifth month onwards, your healthcare provider may either maintain your dose at 1.7 mg weekly or increase your weekly dose to 2.4 mg.   If you need to change the day of the week, you may do so as long as your last dose of WEGOVY was given 2 or more days before.   If you miss a dose of WEGOVY and the next scheduled dose is more than 2 days away (48 hours), take the missed dose as soon as possible. If you miss a dose of WEGOVY and the next schedule dose is less than 2 days away (48 hours), do not administer the dose. Take your next dose on the regularly scheduled day.   If you miss doses of WEGOVY for more than 2 weeks, take your next dose on the regularly scheduled day or call your healthcare provider to talk about how to restart your treatment.   You can take WEGOVY with or without food.   If you take too much WEGOVY, you may have severe nausea, severe vomiting  and severe low blood sugar. Call your healthcare provider or go to the nearest hospital emergency room right away if you experience any of these symptoms.  What are the possible side effects of WEGOVY?   WEGOVY may cause serious side effects, including:   See What is the most important information I should know about WEGOVY?   inflammation of your pancreas (pancreatitis). Stop using WEGOVY and call your healthcare provider right away if you have severe pain in your stomach area (abdomen) that will not go away, with or without vomiting. You may feel the pain from your abdomen to your back.   gallbladder problems. WEGOVY may cause gallbladder problems including gallstones. Some gallbladder problems need surgery. Call your healthcare provider if you have any of the following symptoms:   pain in your upper stomach (abdomen)   yellowing of skin or eyes (jaundice)   fever   maryam-colored stools   increased risk of low blood sugar (hypoglycemia), especially those who also take medicines to treat diabetes mellitus such as insulin or sulfonylureas. Low blood sugar in patients with diabetes who receive WEGOVY can be a serious side effect. Talk to your healthcare provider about how to recognize and treat low blood sugar. You should check your blood sugar before you start taking WEGOVY and while you take WEGOVY. Signs and symptoms of low blood sugar may include:   dizziness or light-headedness   sweating   shakiness   blurred vision   slurred speech   weakness   anxiety   hunger   headache   irritability or mood changes   confusion or drowsiness   fast heartbeat   feeling jittery  kidney problems (kidney failure). In people who have kidney problems, diarrhea, nausea, and vomiting may cause a loss of fluids (dehydration) which may cause kidney problems to get worse. It is important for you to drink fluids to help reduce your chance of dehydration.   serious allergic reactions. Stop using WEGOVY and get medical help right away,  if you have any symptoms of a serious allergic reaction including:   swelling of your face, lips, tongue   severe rash or itching o very rapid heartbeat or throat   problems breathing or swallowing fainting or feeling dizzy   change in vision in people with type 2 diabetes. Tell your healthcare provider if you have changes in vision during treatment with WEGOVY.   increased heart rate. WEGOVY can increase your heart rate while you are at rest. Your healthcare provider should check your heart rate while you take WEGOVY. Tell your healthcare provider if you feel your heart racing or pounding in your chest and it lasts for several minutes.   depression or thoughts of suicide. You should pay attention to any mental changes, especially sudden changes in your mood, behaviors, thoughts, or feelings. Call your healthcare provider right away if you have any mental changes that are new, worse, or worry you.   The most common side effects of WEGOVY in adults or children aged 12 years and older may include:  nausea   stomach (abdomen) pain   dizziness   gas   diarrhea   headache   feeling bloated   stomach flu   vomiting   tiredness (fatigue)   belching   heartburn   constipation   upset stomach   low blood sugar in people   runny nose or sore throat with type 2 diabetes   Talk to your healthcare provider about any side effect that bothers you or does not go away. These are not all the possible side effects of WEGOVY. Call your doctor for medical advice about side effects. You may report side effects to FDA at 6-804-FDA-3171.  General information about the safe and effective use of WEGOVY.  Medicines are sometimes prescribed for purposes other than those listed in a Medication Guide. Do not use WEGOVY for a condition for which it was not prescribed. Do not give WEGOVY to other people, even if they have the same symptoms that you have. It may harm them. You can ask your pharmacist or healthcare provider for information about  WEGOVY that is written for health professionals.  What are the ingredients in WEGOVY?   Active Ingredient: semaglutide   Inactive Ingredients: disodium phosphate dihydrate, 1.42 mg; sodium chloride, 8.25 mg; water for injection; and hydrochloric acid or sodium hydroxide may be added to adjust pH.  For more information, go to [employer] or call 9-678-Qosamn-7.

## 2024-06-11 NOTE — PROGRESS NOTES
Subjective:       Patient ID: Yolie Russell is a 43 y.o. female.    Chief Complaint: labia lesion (Here c/o lesion on left labia x 4 days, tender-wants to be checked for herpes. )    Patient has a history of intermittent outbreaks on labia.  She was asked have follow-up with me if any further lesions occurred.  Last lesion reoccurred a proximally 4 days ago.      Review of Systems      Objective:      Physical Exam  Genitourinary:     Comments: On examination there has a single healing ulcerative lesion of a proximally 2 mm in the midportion of the left labia minora.  Herpes culture was taken.            Assessment:       1. Lesion of labia        Plan:       Patient Instructions   Discussed beginning course of Valtrex 1 g b.i.d. for 10 days.      We will follow-up herpes culture    Will also draw antibody profile

## 2024-06-15 LAB
HSV1 DNA SPEC QL NAA+PROBE: NEGATIVE
HSV2 DNA SPEC QL NAA+PROBE: POSITIVE
SPECIMEN SOURCE: ABNORMAL

## 2024-06-16 ENCOUNTER — TELEPHONE (OUTPATIENT)
Dept: OBSTETRICS AND GYNECOLOGY | Facility: CLINIC | Age: 44
End: 2024-06-16
Payer: COMMERCIAL

## 2024-06-16 NOTE — TELEPHONE ENCOUNTER
Discussed positive herpes culture    She is started on Valtrex as directed    We will initially treat with 1 g b.i.d. times 10 days.  However with subsequent outbreak she will begin 500 mg b.i.d. for 5 days.    She will notify me if any problems arise.

## 2024-08-22 DIAGNOSIS — E66.9 OBESITY, UNSPECIFIED CLASSIFICATION, UNSPECIFIED OBESITY TYPE, UNSPECIFIED WHETHER SERIOUS COMORBIDITY PRESENT: ICD-10-CM

## 2024-08-22 RX ORDER — SEMAGLUTIDE 1 MG/.5ML
1 INJECTION, SOLUTION SUBCUTANEOUS
Qty: 2 ML | Refills: 0 | Status: ACTIVE | OUTPATIENT
Start: 2024-08-22

## 2024-08-22 NOTE — TELEPHONE ENCOUNTER
Patient does not have follow-up visit scheduled. [facility] has provided one refill to hold over the patient until a follow-up appointment can be scheduled. Pt has been contacted to schedule a visit as no additional refills will be provided.   show

## 2024-09-16 ENCOUNTER — OFFICE VISIT (OUTPATIENT)
Dept: INTERNAL MEDICINE | Facility: CLINIC | Age: 44
End: 2024-09-16
Payer: COMMERCIAL

## 2024-09-16 DIAGNOSIS — E66.9 OBESITY, UNSPECIFIED CLASSIFICATION, UNSPECIFIED OBESITY TYPE, UNSPECIFIED WHETHER SERIOUS COMORBIDITY PRESENT: Primary | ICD-10-CM

## 2024-09-16 PROCEDURE — 99499 UNLISTED E&M SERVICE: CPT | Mod: 95,,,

## 2024-09-16 RX ORDER — SEMAGLUTIDE 1.7 MG/.75ML
1.7 INJECTION, SOLUTION SUBCUTANEOUS
Qty: 3 ML | Refills: 0 | Status: ACTIVE | OUTPATIENT
Start: 2024-09-16

## 2024-09-16 RX ORDER — SEMAGLUTIDE 2.4 MG/.75ML
2.4 INJECTION, SOLUTION SUBCUTANEOUS
Qty: 3 ML | Refills: 1 | Status: ACTIVE | OUTPATIENT
Start: 2024-10-14

## 2024-09-16 NOTE — PROGRESS NOTES
Patient ID: Yolie Russell is a 43 y.o. female    Subjective  Chief Complaint: patient presents for medical weight loss management follow-up.    HPI:  Pt reports doing well with the mediation. Currently on Wegovy 1 mg once weekly. Has lost approximately 11 lbs since last visit. Reports tolerating the medication well - denies N/V/D, constipation, and abdominal pain    Contraindications to GLP-1 receptor agonist therapy:   No personal or family history of MEN or MTC, history of allergic reaction while taking a GLP-1 receptor agonist, and history of pancreatitis while taking a GLP-1 receptor agonist     History of weight loss therapy:  Reports has previously tried Adipex and samples of Ozempic    Weight loss history:  Starting weight:    6/7/2024   Recent Readings    Weight (lbs) 198 lb    BMI 31.95 BMI      Current weight:   8/29/2024   Recent Readings    Weight (lbs) 177.4 lb    BMI 28.63 BMI        % Weight Loss since starting GLP-1 RA: 5.6%    Objective  Lab Results   Component Value Date     08/16/2022     Lab Results   Component Value Date    K 4.4 08/16/2022     Lab Results   Component Value Date     08/16/2022     Lab Results   Component Value Date    CO2 24 08/16/2022     Lab Results   Component Value Date    BUN 13 08/16/2022     Lab Results   Component Value Date    CALCIUM 9.3 08/16/2022     Lab Results   Component Value Date    PROT 7.3 08/16/2022     Lab Results   Component Value Date    ALBUMIN 4.1 08/16/2022     Lab Results   Component Value Date    BILITOT 0.5 08/16/2022     Lab Results   Component Value Date    AST 17 08/16/2022     Lab Results   Component Value Date    ALT 40 05/10/2023    ALT 27 08/16/2022     Lab Results   Component Value Date    ANIONGAP 13 08/16/2022     Lab Results   Component Value Date    CREATININE 0.89 08/16/2022     Lab Results   Component Value Date    EGFRNORACEVR 84 08/16/2022         Assessment/Plan  -Continuation of GLP-1 RA therapy  -Will continue titration  of Wegovy  -Increase Wegovy to 1.7 mg once weekly for 4 weeks  -Then increase to Wegovy 2.4 mg once weekly  -RTC in 3 months       Patient consented to pharmacist management via collaborative practice.

## 2024-11-25 NOTE — PROGRESS NOTES
Patient ID: Yolie Russell is a 44 y.o. female    Subjective  Chief Complaint: patient presents for medical weight loss management follow-up.    Co-morbidities: none    HPI: Patient [facility] with [facility] in June 2024 and is currently managed on Wegovy 2.4 mg.     Tolerance to current therapy:  Denies nausea, vomiting, diarrhea, constipation, abdominal pain    Weight loss history:  Starting weight:    6/7/2024   Recent Readings    Weight (lbs) 198 lb    BMI 31.95 BMI    Current weight:    10/29/2024   Recent Readings    Weight (lbs) 159.4 lb    BMI 25.73 BMI    % weight loss since GLP-1 initiation: 19.5 %    Objective  Lab Results   Component Value Date     08/16/2022     Lab Results   Component Value Date    K 4.4 08/16/2022     Lab Results   Component Value Date     08/16/2022     Lab Results   Component Value Date    CO2 24 08/16/2022     Lab Results   Component Value Date    BUN 13 08/16/2022     Lab Results   Component Value Date    CALCIUM 9.3 08/16/2022     Lab Results   Component Value Date    PROT 7.3 08/16/2022     Lab Results   Component Value Date    ALBUMIN 4.1 08/16/2022     Lab Results   Component Value Date    BILITOT 0.5 08/16/2022     Lab Results   Component Value Date    AST 17 08/16/2022     Lab Results   Component Value Date    ALT 40 05/10/2023    ALT 27 08/16/2022     Lab Results   Component Value Date    ANIONGAP 13 08/16/2022     Lab Results   Component Value Date    CREATININE 0.89 08/16/2022     Lab Results   Component Value Date    EGFRNORACEVR 84 08/16/2022     Assessment/Plan  - Continue Wegovy 2.4 mg SQ weekly  - RTC in 6 months for follow-up evaluation    Patient consented to pharmacist management via collaborative practice.

## 2024-11-26 ENCOUNTER — OFFICE VISIT (OUTPATIENT)
Dept: INTERNAL MEDICINE | Facility: CLINIC | Age: 44
End: 2024-11-26
Payer: COMMERCIAL

## 2024-11-26 ENCOUNTER — PATIENT MESSAGE (OUTPATIENT)
Dept: INTERNAL MEDICINE | Facility: CLINIC | Age: 44
End: 2024-11-26

## 2024-11-26 PROCEDURE — 99499 UNLISTED E&M SERVICE: CPT | Mod: 95,,,

## 2024-11-26 RX ORDER — SEMAGLUTIDE 2.4 MG/.75ML
2.4 INJECTION, SOLUTION SUBCUTANEOUS
Qty: 3 ML | Refills: 5 | Status: ACTIVE | OUTPATIENT
Start: 2024-11-26

## 2025-01-24 DIAGNOSIS — N90.89 LESION OF LABIA: ICD-10-CM

## 2025-01-29 ENCOUNTER — PATIENT MESSAGE (OUTPATIENT)
Dept: OBSTETRICS AND GYNECOLOGY | Facility: CLINIC | Age: 45
End: 2025-01-29
Payer: COMMERCIAL

## 2025-01-30 RX ORDER — VALACYCLOVIR HYDROCHLORIDE 1 G/1
1000 TABLET, FILM COATED ORAL 2 TIMES DAILY
Qty: 20 TABLET | Refills: 1 | Status: SHIPPED | OUTPATIENT
Start: 2025-01-30 | End: 2026-01-30

## 2025-04-18 ENCOUNTER — PATIENT MESSAGE (OUTPATIENT)
Dept: ADMINISTRATIVE | Facility: OTHER | Age: 45
End: 2025-04-18
Payer: COMMERCIAL

## 2025-06-06 ENCOUNTER — PATIENT MESSAGE (OUTPATIENT)
Dept: INTERNAL MEDICINE | Facility: CLINIC | Age: 45
End: 2025-06-06
Payer: COMMERCIAL

## 2025-06-09 RX ORDER — ESTRADIOL 2 MG/1
2 TABLET ORAL DAILY
Qty: 30 TABLET | Refills: 2 | Status: SHIPPED | OUTPATIENT
Start: 2025-06-09 | End: 2026-06-09

## 2025-07-07 ENCOUNTER — TELEPHONE (OUTPATIENT)
Dept: FAMILY MEDICINE | Facility: CLINIC | Age: 45
End: 2025-07-07
Payer: COMMERCIAL

## 2025-07-07 NOTE — TELEPHONE ENCOUNTER
Copied from CRM #[id number]. Topic: Medications - Medication Refill  >> Jul 7, 2025  4:23 PM Trinidad wrote:  .Type: RX Refill Request    Who Called: Patient    Have you contacted your pharmacy: No    Refill or New Rx:Refill    RX Name and Strength:semaglutide, weight loss, (WEGOVY) 2.4 mg/0.75 mL PnIj        Preferred Pharmacy with phone number:.  [facility] - [address], [address]  [place] [address]  Phone: [phone] Fax: [phone]    Local or Mail Order:Mail    Ordering Provider:Dexter Ponce    Would the patient rather a call back or a response via [facility]? callback    Best Call Back Number:.[phone]

## (undated) DEVICE — SEAL UNIVERSAL 5MM-8MM XI

## (undated) DEVICE — SEALER VESSEL EXTEND

## (undated) DEVICE — GLOVE 7.0 PROTEXIS PI BLUE

## (undated) DEVICE — GOWN POLY REINF BRTH SLV XL

## (undated) DEVICE — TUBING INSUFFLATION HEATED

## (undated) DEVICE — SCISSOR HOT SHEARS XI

## (undated) DEVICE — SUT VICRYL PLUS 1 CTX 36IN

## (undated) DEVICE — PROGRASP DA VINCI

## (undated) DEVICE — GLOVE PROTEXIS PI SYN SURG 6.5

## (undated) DEVICE — NEEDLE DRIVER SUTURE CUT MEGA ENDOWRIST DAVINCI XI 10US/EA

## (undated) DEVICE — DRAPE ARM DAVINCI XI

## (undated) DEVICE — OBTURATOR BLADELESS 8MM XI

## (undated) DEVICE — SUT 4-0 VICRYL / FS-2

## (undated) DEVICE — SUT VICRYL 0 CT-2 27 DYE

## (undated) DEVICE — GLOVE PROTEXIS PI SYN SURG 8.0

## (undated) DEVICE — TIP RUMI BLUE DISPOSABLE 5/BX

## (undated) DEVICE — COVER TIP CURVED SCISSORS XI

## (undated) DEVICE — TAPE DRAPE STRIP CLSR 4.5X24IN

## (undated) DEVICE — GLOVE PROTEXIS PI SYN SURG 6.0

## (undated) DEVICE — OCCLUDER COLPO-PNEUMO STERILE

## (undated) DEVICE — FORCEP FENESTRATED BIPOLAR

## (undated) DEVICE — SUT 0 VICRYL / UR6 (J603)

## (undated) DEVICE — SOL NACL IRR 1000ML BTL

## (undated) DEVICE — SOL NACL IRR 3000ML

## (undated) DEVICE — KIT ROBOTIC RUSH